# Patient Record
Sex: FEMALE | Race: BLACK OR AFRICAN AMERICAN | HISPANIC OR LATINO | Employment: FULL TIME | URBAN - METROPOLITAN AREA
[De-identification: names, ages, dates, MRNs, and addresses within clinical notes are randomized per-mention and may not be internally consistent; named-entity substitution may affect disease eponyms.]

---

## 2019-10-30 ENCOUNTER — HOSPITAL ENCOUNTER (EMERGENCY)
Facility: HOSPITAL | Age: 59
Discharge: HOME | End: 2019-10-30
Attending: EMERGENCY MEDICINE | Admitting: EMERGENCY MEDICINE
Payer: COMMERCIAL

## 2019-10-30 VITALS
RESPIRATION RATE: 16 BRPM | DIASTOLIC BLOOD PRESSURE: 62 MMHG | WEIGHT: 180 LBS | OXYGEN SATURATION: 97 % | TEMPERATURE: 97.7 F | HEIGHT: 64 IN | HEART RATE: 56 BPM | SYSTOLIC BLOOD PRESSURE: 123 MMHG | BODY MASS INDEX: 30.73 KG/M2

## 2019-10-30 DIAGNOSIS — T14.8XXA MUSCLE STRAIN: ICD-10-CM

## 2019-10-30 DIAGNOSIS — R10.9 FLANK PAIN: Primary | ICD-10-CM

## 2019-10-30 LAB
ALBUMIN SERPL BCP-MCNC: 3.5 G/DL (ref 3.5–5)
ALP SERPL-CCNC: 99 U/L (ref 46–116)
ALT SERPL W P-5'-P-CCNC: 26 U/L (ref 12–78)
ANION GAP SERPL CALCULATED.3IONS-SCNC: 8 MMOL/L (ref 4–13)
AST SERPL W P-5'-P-CCNC: 26 U/L (ref 5–45)
BASOPHILS # BLD AUTO: 0.02 THOUSANDS/ΜL (ref 0–0.1)
BASOPHILS NFR BLD AUTO: 0 % (ref 0–1)
BILIRUB SERPL-MCNC: 0.4 MG/DL (ref 0.2–1)
BILIRUB UR QL STRIP: NEGATIVE
BUN SERPL-MCNC: 12 MG/DL (ref 5–25)
CALCIUM SERPL-MCNC: 9.1 MG/DL (ref 8.3–10.1)
CHLORIDE SERPL-SCNC: 102 MMOL/L (ref 100–108)
CLARITY UR: CLEAR
CO2 SERPL-SCNC: 27 MMOL/L (ref 21–32)
COLOR UR: YELLOW
CREAT SERPL-MCNC: 0.63 MG/DL (ref 0.6–1.3)
EOSINOPHIL # BLD AUTO: 0.08 THOUSAND/ΜL (ref 0–0.61)
EOSINOPHIL NFR BLD AUTO: 2 % (ref 0–6)
ERYTHROCYTE [DISTWIDTH] IN BLOOD BY AUTOMATED COUNT: 19.3 % (ref 11.6–15.1)
GFR SERPL CREATININE-BSD FRML MDRD: 115 ML/MIN/1.73SQ M
GLUCOSE SERPL-MCNC: 89 MG/DL (ref 65–140)
GLUCOSE UR STRIP-MCNC: NEGATIVE MG/DL
HCT VFR BLD AUTO: 36.4 % (ref 34.8–46.1)
HGB BLD-MCNC: 11.1 G/DL (ref 11.5–15.4)
HGB UR QL STRIP.AUTO: NEGATIVE
IMM GRANULOCYTES # BLD AUTO: 0.01 THOUSAND/UL (ref 0–0.2)
IMM GRANULOCYTES NFR BLD AUTO: 0 % (ref 0–2)
KETONES UR STRIP-MCNC: NEGATIVE MG/DL
LEUKOCYTE ESTERASE UR QL STRIP: NEGATIVE
LIPASE SERPL-CCNC: 74 U/L (ref 73–393)
LYMPHOCYTES # BLD AUTO: 1.98 THOUSANDS/ΜL (ref 0.6–4.47)
LYMPHOCYTES NFR BLD AUTO: 38 % (ref 14–44)
MCH RBC QN AUTO: 24.6 PG (ref 26.8–34.3)
MCHC RBC AUTO-ENTMCNC: 30.5 G/DL (ref 31.4–37.4)
MCV RBC AUTO: 81 FL (ref 82–98)
MONOCYTES # BLD AUTO: 0.41 THOUSAND/ΜL (ref 0.17–1.22)
MONOCYTES NFR BLD AUTO: 8 % (ref 4–12)
NEUTROPHILS # BLD AUTO: 2.76 THOUSANDS/ΜL (ref 1.85–7.62)
NEUTS SEG NFR BLD AUTO: 52 % (ref 43–75)
NITRITE UR QL STRIP: NEGATIVE
NRBC BLD AUTO-RTO: 0 /100 WBCS
PH UR STRIP.AUTO: 7 [PH]
PLATELET # BLD AUTO: 326 THOUSANDS/UL (ref 149–390)
PMV BLD AUTO: 9.1 FL (ref 8.9–12.7)
POTASSIUM SERPL-SCNC: 4.7 MMOL/L (ref 3.5–5.3)
PROT SERPL-MCNC: 8 G/DL (ref 6.4–8.2)
PROT UR STRIP-MCNC: NEGATIVE MG/DL
RBC # BLD AUTO: 4.51 MILLION/UL (ref 3.81–5.12)
SODIUM SERPL-SCNC: 137 MMOL/L (ref 136–145)
SP GR UR STRIP.AUTO: 1.01 (ref 1–1.03)
UROBILINOGEN UR QL STRIP.AUTO: 0.2 E.U./DL
WBC # BLD AUTO: 5.26 THOUSAND/UL (ref 4.31–10.16)

## 2019-10-30 PROCEDURE — 99284 EMERGENCY DEPT VISIT MOD MDM: CPT

## 2019-10-30 PROCEDURE — 85025 COMPLETE CBC W/AUTO DIFF WBC: CPT | Performed by: EMERGENCY MEDICINE

## 2019-10-30 PROCEDURE — 36415 COLL VENOUS BLD VENIPUNCTURE: CPT | Performed by: EMERGENCY MEDICINE

## 2019-10-30 PROCEDURE — 80053 COMPREHEN METABOLIC PANEL: CPT | Performed by: EMERGENCY MEDICINE

## 2019-10-30 PROCEDURE — 83690 ASSAY OF LIPASE: CPT | Performed by: EMERGENCY MEDICINE

## 2019-10-30 PROCEDURE — 99284 EMERGENCY DEPT VISIT MOD MDM: CPT | Performed by: EMERGENCY MEDICINE

## 2019-10-30 PROCEDURE — 81003 URINALYSIS AUTO W/O SCOPE: CPT | Performed by: EMERGENCY MEDICINE

## 2019-10-30 PROCEDURE — 96374 THER/PROPH/DIAG INJ IV PUSH: CPT

## 2019-10-30 RX ORDER — NAPROXEN 500 MG/1
500 TABLET ORAL 2 TIMES DAILY WITH MEALS
Qty: 30 TABLET | Refills: 0 | Status: SHIPPED | OUTPATIENT
Start: 2019-10-30

## 2019-10-30 RX ORDER — KETOROLAC TROMETHAMINE 30 MG/ML
30 INJECTION, SOLUTION INTRAMUSCULAR; INTRAVENOUS ONCE
Status: COMPLETED | OUTPATIENT
Start: 2019-10-30 | End: 2019-10-30

## 2019-10-30 RX ORDER — METHOCARBAMOL 750 MG/1
750 TABLET, FILM COATED ORAL EVERY 6 HOURS PRN
Qty: 20 TABLET | Refills: 0 | Status: SHIPPED | OUTPATIENT
Start: 2019-10-30

## 2019-10-30 RX ADMIN — KETOROLAC TROMETHAMINE 30 MG: 30 INJECTION, SOLUTION INTRAMUSCULAR at 11:50

## 2019-10-30 NOTE — ED PROVIDER NOTES
History  Chief Complaint   Patient presents with    Flank Pain     c/o ongoing flank pain, had a CT done and shower "there's something on my right kidney"     Patient is a 53-year-old female  She presents to the emergency room for evaluation of right flank pain  It started about 3 weeks ago  She denies any trauma  She saw her primary MD   She had an ultrasound and a CT scan with contrast that showed a benign lesion to the right kidney  No kidney stones  There was a small fat containing umbilical hernia  She had fibroids  Otherwise examination was normal   She has been taking Aleve for the pain without relief  No nausea or vomiting  She has had some diarrhea  No constipation  No hematemesis, hematochezia or melena  No chest pain or shortness of breath  No fever or chills  No urinary or vaginal complaints  The pain is moderately severe and is worse with movement  None       History reviewed  No pertinent past medical history  Past Surgical History:   Procedure Laterality Date    KNEE SURGERY         History reviewed  No pertinent family history  I have reviewed and agree with the history as documented  Social History     Tobacco Use    Smoking status: Current Every Day Smoker     Packs/day: 0 25     Types: Cigarettes    Smokeless tobacco: Never Used   Substance Use Topics    Alcohol use: Not Currently    Drug use: Never        Review of Systems   Constitutional: Negative for chills and fever  HENT: Negative for rhinorrhea and sore throat  Eyes: Negative for pain, redness and visual disturbance  Respiratory: Negative for cough and shortness of breath  Cardiovascular: Negative for chest pain and leg swelling  Gastrointestinal: Positive for abdominal pain  Negative for diarrhea and vomiting  Endocrine: Negative for polydipsia and polyuria  Genitourinary: Positive for flank pain  Negative for dysuria, frequency, hematuria, vaginal bleeding and vaginal discharge  Musculoskeletal: Positive for back pain  Negative for neck pain  Skin: Negative for rash and wound  Allergic/Immunologic: Negative for immunocompromised state  Neurological: Negative for weakness, numbness and headaches  Hematological: Does not bruise/bleed easily  Psychiatric/Behavioral: Negative for hallucinations and suicidal ideas  All other systems reviewed and are negative  Physical Exam  Physical Exam   Constitutional: She is oriented to person, place, and time  She appears well-developed and well-nourished  No distress  HENT:   Head: Normocephalic and atraumatic  Mouth/Throat: Oropharynx is clear and moist    Eyes: Right eye exhibits no discharge  Left eye exhibits no discharge  No scleral icterus  Neck: Normal range of motion  Neck supple  Cardiovascular: Normal rate, regular rhythm, normal heart sounds and intact distal pulses  Exam reveals no gallop and no friction rub  No murmur heard  Pulmonary/Chest: Effort normal and breath sounds normal  No respiratory distress  She has no wheezes  She has no rales  Abdominal: Soft  Bowel sounds are normal  She exhibits no distension  There is no tenderness  There is no rebound and no guarding  No pulsatile mass  Musculoskeletal: She exhibits tenderness  She exhibits no edema or deformity  There is tenderness to the right flank into the right CVA area  There is pain with range of motion  Neurological: She is alert and oriented to person, place, and time  She has normal strength  No sensory deficit  GCS eye subscore is 4  GCS verbal subscore is 5  GCS motor subscore is 6  Skin: Skin is warm and dry  No rash noted  She is not diaphoretic  Psychiatric: She has a normal mood and affect  Her behavior is normal    Vitals reviewed        Vital Signs  ED Triage Vitals   Temperature Pulse Respirations Blood Pressure SpO2   10/30/19 1051 10/30/19 1049 10/30/19 1049 10/30/19 1049 10/30/19 1049   97 7 °F (36 5 °C) 60 18 130/62 98 % Temp Source Heart Rate Source Patient Position - Orthostatic VS BP Location FiO2 (%)   10/30/19 1051 10/30/19 1049 10/30/19 1049 10/30/19 1049 --   Oral Monitor Sitting Right arm       Pain Score       10/30/19 1049       8           Vitals:    10/30/19 1049 10/30/19 1115 10/30/19 1230   BP: 130/62 129/62 117/60   Pulse: 60 69 58   Patient Position - Orthostatic VS: Sitting Lying          Visual Acuity      ED Medications  Medications   ketorolac (TORADOL) injection 30 mg (30 mg Intravenous Given 10/30/19 1150)       Diagnostic Studies  Results Reviewed     Procedure Component Value Units Date/Time    Comprehensive metabolic panel [[id number]] Collected:  10/30/19 1157    Lab Status:  Final result Specimen:  Blood from Arm, Right Updated:  10/30/19 1228     Sodium 137 mmol/L      Potassium 4 7 mmol/L      Chloride 102 mmol/L      CO2 27 mmol/L      ANION GAP 8 mmol/L      BUN 12 mg/dL      Creatinine 0 63 mg/dL      Glucose 89 mg/dL      Calcium 9 1 mg/dL      AST 26 U/L      ALT 26 U/L      Alkaline Phosphatase 99 U/L      Total Protein 8 0 g/dL      Albumin 3 5 g/dL      Total Bilirubin 0 40 mg/dL      eGFR 115 ml/min/1 73sq m     Narrative:       [place] guidelines for Chronic Kidney Disease (CKD):     Stage 1 with normal or high GFR (GFR > 90 mL/min/1 73 square meters)    Stage 2 Mild CKD (GFR = 60-89 mL/min/1 73 square meters)    Stage 3A Moderate CKD (GFR = 45-59 mL/min/1 73 square meters)    Stage 3B Moderate CKD (GFR = 30-44 mL/min/1 73 square meters)    Stage 4 Severe CKD (GFR = 15-29 mL/min/1 73 square meters)    Stage 5 End Stage CKD (GFR <15 mL/min/1 73 square meters)  Note: GFR calculation is accurate only with a steady state creatinine    Lipase [[id number]]  (Normal) Collected:  10/30/19 1157    Lab Status:  Final result Specimen:  Blood from Arm, Right Updated:  10/30/19 1228     Lipase 74 u/L     CBC and differential [[id number]]  (Abnormal) Collected:  10/30/19 1157 Lab Status:  Final result Specimen:  Blood from Arm, Right Updated:  10/30/19 1209     WBC 5 26 Thousand/uL      RBC 4 51 Million/uL      Hemoglobin 11 1 g/dL      Hematocrit 36 4 %      MCV 81 fL      MCH 24 6 pg      MCHC 30 5 g/dL      RDW 19 3 %      MPV 9 1 fL      Platelets 991 Thousands/uL      nRBC 0 /100 WBCs      Neutrophils Relative 52 %      Immat GRANS % 0 %      Lymphocytes Relative 38 %      Monocytes Relative 8 %      Eosinophils Relative 2 %      Basophils Relative 0 %      Neutrophils Absolute 2 76 Thousands/µL      Immature Grans Absolute 0 01 Thousand/uL      Lymphocytes Absolute 1 98 Thousands/µL      Monocytes Absolute 0 41 Thousand/µL      Eosinophils Absolute 0 08 Thousand/µL      Basophils Absolute 0 02 Thousands/µL     UA w Reflex to Microscopic w Reflex to Culture [[id number]] Collected:  10/30/19 1158    Lab Status:  Final result Specimen:  Urine, Clean Catch Updated:  10/30/19 1208     Color, UA Yellow     Clarity, UA Clear     Specific Gravity, UA 1 010     pH, UA 7 0     Leukocytes, UA Negative     Nitrite, UA Negative     Protein, UA Negative mg/dl      Glucose, UA Negative mg/dl      Ketones, UA Negative mg/dl      Urobilinogen, UA 0 2 E U /dl      Bilirubin, UA Negative     Blood, UA Negative                 No orders to display              Procedures  Procedures       ED Course                               MDM  Number of Diagnoses or Management Options  Diagnosis management comments: Laboratory evaluation was unremarkable  There was no hematuria  No urinary tract infection  Patient did have decreases in her indices  She is likely iron deficient  She can follow up with her primary MD for this  Otherwise, I feel this is musculoskeletal   Imaging failed to show a cause for her pain  It is clearly worse with palpation and movement  It is more in her side and in the CVA area or abdomen  Most likely this is muscular pain    Appropriate for discharge and outpatient management  Amount and/or Complexity of Data Reviewed  Clinical lab tests: ordered and reviewed  Review and summarize past medical records: yes        Disposition  Final diagnoses:   Flank pain   Muscle strain     Time reflects when diagnosis was documented in both MDM as applicable and the Disposition within this note     Time User Action Codes Description Comment    10/30/2019  1:05 PM Venus Pennington Add [R10 9] Flank pain     10/30/2019  1:05 PM Virginie Roberta  8XXA] Muscle strain       ED Disposition     ED Disposition Condition Date/Time Comment    Discharge Stable Wed Oct 30, 2019  1:05 PM Eric Bess discharge to home/self care  Follow-up Information     Follow up With Specialties Details Why Contact Info    Follow-up with your regular doctor in 1 week for re-evaluation  Patient's Medications   Discharge Prescriptions    METHOCARBAMOL (ROBAXIN) 750 MG TABLET    Take 1 tablet (750 mg total) by mouth every 6 (six) hours as needed for muscle spasms (Muscle pain)       Start Date: 10/30/2019End Date: --       Order Dose: 750 mg       Quantity: 20 tablet    Refills: 0    NAPROXEN (NAPROSYN) 500 MG TABLET    Take 1 tablet (500 mg total) by mouth 2 (two) times a day with meals PRN pain       Start Date: 10/30/2019End Date: --       Order Dose: 500 mg       Quantity: 30 tablet    Refills: 0     No discharge procedures on file      ED Provider  Electronically Signed by           Violette Reyes MD  10/30/19 5850

## 2020-10-18 ENCOUNTER — APPOINTMENT (EMERGENCY)
Dept: VASCULAR ULTRASOUND | Facility: HOSPITAL | Age: 60
End: 2020-10-18
Payer: COMMERCIAL

## 2020-10-18 ENCOUNTER — HOSPITAL ENCOUNTER (EMERGENCY)
Facility: HOSPITAL | Age: 60
Discharge: HOME | End: 2020-10-18
Attending: EMERGENCY MEDICINE | Admitting: EMERGENCY MEDICINE
Payer: COMMERCIAL

## 2020-10-18 VITALS
HEART RATE: 65 BPM | OXYGEN SATURATION: 98 % | RESPIRATION RATE: 16 BRPM | SYSTOLIC BLOOD PRESSURE: 172 MMHG | DIASTOLIC BLOOD PRESSURE: 78 MMHG | TEMPERATURE: 98.8 F

## 2020-10-18 DIAGNOSIS — Z51.89 VISIT FOR WOUND CHECK: Primary | ICD-10-CM

## 2020-10-18 LAB
ALBUMIN SERPL BCP-MCNC: 2.9 G/DL (ref 3.5–5)
ALP SERPL-CCNC: 72 U/L (ref 46–116)
ALT SERPL W P-5'-P-CCNC: 35 U/L (ref 12–78)
ANION GAP SERPL CALCULATED.3IONS-SCNC: 6 MMOL/L (ref 4–13)
AST SERPL W P-5'-P-CCNC: 27 U/L (ref 5–45)
BASOPHILS # BLD AUTO: 0.02 THOUSANDS/ΜL (ref 0–0.1)
BASOPHILS NFR BLD AUTO: 0 % (ref 0–1)
BILIRUB DIRECT SERPL-MCNC: 0.17 MG/DL (ref 0–0.2)
BILIRUB SERPL-MCNC: 0.6 MG/DL (ref 0.2–1)
BUN SERPL-MCNC: 12 MG/DL (ref 5–25)
CALCIUM SERPL-MCNC: 8.8 MG/DL (ref 8.3–10.1)
CHLORIDE SERPL-SCNC: 103 MMOL/L (ref 100–108)
CO2 SERPL-SCNC: 28 MMOL/L (ref 21–32)
CREAT SERPL-MCNC: 0.76 MG/DL (ref 0.6–1.3)
EOSINOPHIL # BLD AUTO: 0.07 THOUSAND/ΜL (ref 0–0.61)
EOSINOPHIL NFR BLD AUTO: 1 % (ref 0–6)
ERYTHROCYTE [DISTWIDTH] IN BLOOD BY AUTOMATED COUNT: 15.5 % (ref 11.6–15.1)
GFR SERPL CREATININE-BSD FRML MDRD: 100 ML/MIN/1.73SQ M
GLUCOSE SERPL-MCNC: 153 MG/DL (ref 65–140)
HCT VFR BLD AUTO: 30.8 % (ref 34.8–46.1)
HGB BLD-MCNC: 9.5 G/DL (ref 11.5–15.4)
IMM GRANULOCYTES # BLD AUTO: 0.03 THOUSAND/UL (ref 0–0.2)
IMM GRANULOCYTES NFR BLD AUTO: 0 % (ref 0–2)
LYMPHOCYTES # BLD AUTO: 1.95 THOUSANDS/ΜL (ref 0.6–4.47)
LYMPHOCYTES NFR BLD AUTO: 27 % (ref 14–44)
MCH RBC QN AUTO: 27.5 PG (ref 26.8–34.3)
MCHC RBC AUTO-ENTMCNC: 30.8 G/DL (ref 31.4–37.4)
MCV RBC AUTO: 89 FL (ref 82–98)
MONOCYTES # BLD AUTO: 0.39 THOUSAND/ΜL (ref 0.17–1.22)
MONOCYTES NFR BLD AUTO: 5 % (ref 4–12)
NEUTROPHILS # BLD AUTO: 4.7 THOUSANDS/ΜL (ref 1.85–7.62)
NEUTS SEG NFR BLD AUTO: 67 % (ref 43–75)
NRBC BLD AUTO-RTO: 0 /100 WBCS
PLATELET # BLD AUTO: 320 THOUSANDS/UL (ref 149–390)
PMV BLD AUTO: 8.7 FL (ref 8.9–12.7)
POTASSIUM SERPL-SCNC: 3.9 MMOL/L (ref 3.5–5.3)
PROT SERPL-MCNC: 7.7 G/DL (ref 6.4–8.2)
RBC # BLD AUTO: 3.46 MILLION/UL (ref 3.81–5.12)
SODIUM SERPL-SCNC: 137 MMOL/L (ref 136–145)
WBC # BLD AUTO: 7.16 THOUSAND/UL (ref 4.31–10.16)

## 2020-10-18 PROCEDURE — 99284 EMERGENCY DEPT VISIT MOD MDM: CPT | Performed by: EMERGENCY MEDICINE

## 2020-10-18 PROCEDURE — 80076 HEPATIC FUNCTION PANEL: CPT | Performed by: EMERGENCY MEDICINE

## 2020-10-18 PROCEDURE — 36415 COLL VENOUS BLD VENIPUNCTURE: CPT | Performed by: EMERGENCY MEDICINE

## 2020-10-18 PROCEDURE — 93971 EXTREMITY STUDY: CPT | Performed by: SURGERY

## 2020-10-18 PROCEDURE — 93971 EXTREMITY STUDY: CPT

## 2020-10-18 PROCEDURE — 99283 EMERGENCY DEPT VISIT LOW MDM: CPT

## 2020-10-18 PROCEDURE — 85025 COMPLETE CBC W/AUTO DIFF WBC: CPT | Performed by: EMERGENCY MEDICINE

## 2020-10-18 PROCEDURE — 80048 BASIC METABOLIC PNL TOTAL CA: CPT | Performed by: EMERGENCY MEDICINE

## 2020-10-18 RX ORDER — POLYETHYLENE GLYCOL 1450
17 POWDER (GRAM) MISCELLANEOUS
COMMUNITY
Start: 2020-10-14 | End: 2020-10-28

## 2020-10-18 RX ORDER — OXYCODONE HYDROCHLORIDE 5 MG/1
2.5-5 TABLET ORAL EVERY 6 HOURS PRN
COMMUNITY
Start: 2020-10-14 | End: 2020-10-21

## 2021-12-02 ENCOUNTER — HOSPITAL ENCOUNTER (EMERGENCY)
Facility: HOSPITAL | Age: 61
Discharge: HOME | End: 2021-12-02
Admitting: EMERGENCY MEDICINE
Payer: COMMERCIAL

## 2021-12-02 VITALS
TEMPERATURE: 98 F | RESPIRATION RATE: 16 BRPM | SYSTOLIC BLOOD PRESSURE: 135 MMHG | HEART RATE: 73 BPM | DIASTOLIC BLOOD PRESSURE: 60 MMHG | OXYGEN SATURATION: 100 %

## 2021-12-02 DIAGNOSIS — J02.9 SORE THROAT: Primary | ICD-10-CM

## 2021-12-02 DIAGNOSIS — Z20.822 CLOSE EXPOSURE TO COVID-19 VIRUS: ICD-10-CM

## 2021-12-02 LAB
FLUAV RNA RESP QL NAA+PROBE: NEGATIVE
FLUBV RNA RESP QL NAA+PROBE: NEGATIVE
RSV RNA RESP QL NAA+PROBE: NEGATIVE
S PYO DNA THROAT QL NAA+PROBE: NORMAL
SARS-COV-2 RNA RESP QL NAA+PROBE: POSITIVE

## 2021-12-02 PROCEDURE — 99283 EMERGENCY DEPT VISIT LOW MDM: CPT | Performed by: PHYSICIAN ASSISTANT

## 2021-12-02 PROCEDURE — 0241U HB NFCT DS VIR RESP RNA 4 TRGT: CPT | Performed by: PHYSICIAN ASSISTANT

## 2021-12-02 PROCEDURE — 99283 EMERGENCY DEPT VISIT LOW MDM: CPT

## 2021-12-02 PROCEDURE — 87651 STREP A DNA AMP PROBE: CPT | Performed by: PHYSICIAN ASSISTANT

## 2022-11-30 ENCOUNTER — APPOINTMENT (EMERGENCY)
Dept: CT IMAGING | Facility: HOSPITAL | Age: 62
End: 2022-11-30

## 2022-11-30 ENCOUNTER — HOSPITAL ENCOUNTER (EMERGENCY)
Facility: HOSPITAL | Age: 62
Discharge: HOME | End: 2022-12-01
Attending: EMERGENCY MEDICINE

## 2022-11-30 DIAGNOSIS — R13.10 DYSPHAGIA: Primary | ICD-10-CM

## 2022-11-30 DIAGNOSIS — N85.2 ENLARGED UTERUS: ICD-10-CM

## 2022-11-30 LAB
ALBUMIN SERPL BCP-MCNC: 3.8 G/DL (ref 3.5–5)
ALP SERPL-CCNC: 97 U/L (ref 46–116)
ALT SERPL W P-5'-P-CCNC: 23 U/L (ref 12–78)
ANION GAP SERPL CALCULATED.3IONS-SCNC: 9 MMOL/L (ref 4–13)
AST SERPL W P-5'-P-CCNC: 19 U/L (ref 5–45)
BASOPHILS # BLD AUTO: 0.02 THOUSANDS/ÂΜL (ref 0–0.1)
BASOPHILS NFR BLD AUTO: 0 % (ref 0–1)
BILIRUB SERPL-MCNC: 0.3 MG/DL (ref 0.2–1)
BUN SERPL-MCNC: 12 MG/DL (ref 5–25)
CALCIUM SERPL-MCNC: 9.5 MG/DL (ref 8.3–10.1)
CARDIAC TROPONIN I PNL SERPL HS: 6 NG/L
CHLORIDE SERPL-SCNC: 103 MMOL/L (ref 96–108)
CO2 SERPL-SCNC: 26 MMOL/L (ref 21–32)
CREAT SERPL-MCNC: 0.78 MG/DL (ref 0.6–1.3)
EOSINOPHIL # BLD AUTO: 0.07 THOUSAND/ÂΜL (ref 0–0.61)
EOSINOPHIL NFR BLD AUTO: 1 % (ref 0–6)
ERYTHROCYTE [DISTWIDTH] IN BLOOD BY AUTOMATED COUNT: 14.6 % (ref 11.6–15.1)
GFR SERPL CREATININE-BSD FRML MDRD: 82 ML/MIN/1.73SQ M
GLUCOSE SERPL-MCNC: 100 MG/DL (ref 65–140)
HCT VFR BLD AUTO: 41.1 % (ref 34.8–46.1)
HGB BLD-MCNC: 12.9 G/DL (ref 11.5–15.4)
IMM GRANULOCYTES # BLD AUTO: 0.01 THOUSAND/UL (ref 0–0.2)
IMM GRANULOCYTES NFR BLD AUTO: 0 % (ref 0–2)
LIPASE SERPL-CCNC: 76 U/L (ref 73–393)
LYMPHOCYTES # BLD AUTO: 1.74 THOUSANDS/ÂΜL (ref 0.6–4.47)
LYMPHOCYTES NFR BLD AUTO: 33 % (ref 14–44)
MCH RBC QN AUTO: 28.4 PG (ref 26.8–34.3)
MCHC RBC AUTO-ENTMCNC: 31.4 G/DL (ref 31.4–37.4)
MCV RBC AUTO: 91 FL (ref 82–98)
MONOCYTES # BLD AUTO: 0.43 THOUSAND/ÂΜL (ref 0.17–1.22)
MONOCYTES NFR BLD AUTO: 8 % (ref 4–12)
NEUTROPHILS # BLD AUTO: 2.95 THOUSANDS/ÂΜL (ref 1.85–7.62)
NEUTS SEG NFR BLD AUTO: 58 % (ref 43–75)
NRBC BLD AUTO-RTO: 0 /100 WBCS
PLATELET # BLD AUTO: 296 THOUSANDS/UL (ref 149–390)
PMV BLD AUTO: 9.3 FL (ref 8.9–12.7)
POTASSIUM SERPL-SCNC: 3.9 MMOL/L (ref 3.5–5.3)
PROT SERPL-MCNC: 8.1 G/DL (ref 6.4–8.4)
RBC # BLD AUTO: 4.54 MILLION/UL (ref 3.81–5.12)
SODIUM SERPL-SCNC: 138 MMOL/L (ref 135–147)
WBC # BLD AUTO: 5.22 THOUSAND/UL (ref 4.31–10.16)

## 2022-11-30 RX ADMIN — SODIUM CHLORIDE 1000 ML: 0.9 INJECTION, SOLUTION INTRAVENOUS at 20:21

## 2022-11-30 RX ADMIN — IOHEXOL 100 ML: 350 INJECTION, SOLUTION INTRAVENOUS at 21:29

## 2022-12-01 ENCOUNTER — TELEPHONE (OUTPATIENT)
Dept: OTHER | Facility: OTHER | Age: 62
End: 2022-12-01

## 2022-12-01 VITALS
RESPIRATION RATE: 18 BRPM | DIASTOLIC BLOOD PRESSURE: 90 MMHG | OXYGEN SATURATION: 95 % | SYSTOLIC BLOOD PRESSURE: 132 MMHG | TEMPERATURE: 97.2 F | HEART RATE: 67 BPM

## 2022-12-01 LAB
ATRIAL RATE: 68 BPM
P AXIS: 66 DEGREES
PR INTERVAL: 174 MS
QRS AXIS: 1 DEGREES
QRSD INTERVAL: 70 MS
QT INTERVAL: 408 MS
QTC INTERVAL: 433 MS
T WAVE AXIS: 36 DEGREES
VENTRICULAR RATE: 68 BPM

## 2022-12-01 NOTE — ED NOTES
Alert in no acute distress pt tolerated po fluids  Written  Discharge instructions given  No c/o offered       Victorina Bird RN  12/01/22 6077

## 2022-12-01 NOTE — DISCHARGE INSTRUCTIONS
Please follow-up with gastroenterology for her difficulty swallowing  Please follow-up with OBGYN for your enlarged uterus  Please call them tomorrow  Return to the emergency department for any new worsening symptoms or if you cannot tolerate liquids

## 2022-12-01 NOTE — ED PROVIDER NOTES
Pt Name: Zoe Brian  MRN: [id number]  [place] 1960  Age/Sex: 63 y o  female  Date of evaluation: 11/30/2022  PCP: No primary care provider on file  CHIEF COMPLAINT    Chief Complaint   Patient presents with   • Abdominal Pain     Pt states she has been having digestion issue for the last week and feels she is unable to digest her foot  Pt adds she has been vomiting with a burning epigastric pain  HPI and MDM    63 y o  female presenting with dysphagia  Patient states over the last week or so she has been having difficulty swallowing  Also has abdominal pain, points towards her epigastric region  States it is primarily with solids, but sometimes also has difficulty with liquids  This has never occurred before  Denies any history of reflux  Denies any medical problems  Has had some nausea and vomiting as well  Medications   sodium chloride 0 9 % bolus 1,000 mL (has no administration in time range)         Past Medical and Surgical History    History reviewed  No pertinent past medical history  Past Surgical History:   Procedure Laterality Date   • KNEE SURGERY         History reviewed  No pertinent family history  Social History     Tobacco Use   • Smoking status: Every Day     Packs/day: 0 25     Types: Cigarettes   • Smokeless tobacco: Never   Substance Use Topics   • Alcohol use: Not Currently   • Drug use: Never           Allergies    Allergies   Allergen Reactions   • Penicillins Rash       Home Medications    Prior to Admission medications    Medication Sig Start Date End Date Taking?  Authorizing Provider   Cyanocobalamin 1000 MCG SUBL Place 1,000 mcg under the tongue 9/8/20   Historical Provider, MD   methocarbamol (ROBAXIN) 750 mg tablet Take 1 tablet (750 mg total) by mouth every 6 (six) hours as needed for muscle spasms (Muscle pain) 10/30/19   Debra Shankar MD   naproxen (NAPROSYN) 500 mg tablet Take 1 tablet (500 mg total) by mouth 2 (two) times a day with meals PRN pain 10/30/19   Modesto Gimenez MD           Review of Systems    Review of Systems   Constitutional: Negative for chills and fever  HENT: Negative for rhinorrhea and sore throat  Eyes: Negative for pain and visual disturbance  Respiratory: Negative for cough and shortness of breath  Cardiovascular: Negative for chest pain and leg swelling  Gastrointestinal: Positive for abdominal pain, nausea and vomiting  Genitourinary: Negative for dysuria and hematuria  Musculoskeletal: Negative for back pain and myalgias  Skin: Negative for rash and wound  Neurological: Negative for syncope and headaches  Physical Exam      ED Triage Vitals   Temperature Pulse Respirations Blood Pressure SpO2   11/30/22 1827 11/30/22 1827 11/30/22 1827 11/30/22 1828 11/30/22 1827   (!) 97 2 °F (36 2 °C) 90 20 167/70 97 %      Temp src Heart Rate Source Patient Position - Orthostatic VS BP Location FiO2 (%)   -- -- -- -- --             Pain Score       11/30/22 1827       8               Physical Exam  Constitutional:       General: She is not in acute distress  Appearance: She is not ill-appearing  HENT:      Head: Normocephalic and atraumatic  Nose: Nose normal       Mouth/Throat:      Mouth: Mucous membranes are moist    Eyes:      Extraocular Movements: Extraocular movements intact  Pupils: Pupils are equal, round, and reactive to light  Cardiovascular:      Rate and Rhythm: Normal rate and regular rhythm  Pulmonary:      Effort: No respiratory distress  Breath sounds: Normal breath sounds  No wheezing  Abdominal:      General: There is no distension  Palpations: Abdomen is soft  Tenderness: There is no abdominal tenderness  Musculoskeletal:         General: No swelling or deformity  Normal range of motion  Cervical back: Normal range of motion and neck supple  Skin:     General: Skin is warm  Findings: No erythema     Neurological: Mental Status: She is alert and oriented to person, place, and time  Mental status is at baseline  Diagnostic Results      Labs:    Results Reviewed     Procedure Component Value Units Date/Time    CBC and differential [[id number]]     Lab Status: No result Specimen: Blood     Comprehensive metabolic panel [[id number]]     Lab Status: No result Specimen: Blood     HS Troponin 0hr (reflex protocol) [[id number]]     Lab Status: No result Specimen: Blood     Lipase [[id number]]     Lab Status: No result Specimen: Blood           All labs reviewed and utilized in the medical decision making process    Radiology:    CT chest abdomen pelvis w contrast    (Results Pending)       All radiology studies independently viewed by me and interpreted by the radiologist     Procedure    Procedures        FINAL IMPRESSION    Final diagnoses:   None         DISPOSITION    ED Disposition     None      Follow-up Information    None           PATIENT REFERRED TO:    No follow-up provider specified  DISCHARGE MEDICATIONS:    Patient's Medications   Discharge Prescriptions    No medications on file       No discharge procedures on file  Luz Mccormick DO        This note was partially completed using voice recognition technology, and was scanned for gross errors; however some errors may still exist  Please contact the author with any questions or requests for clarification  Moderate CKD (GFR = 45-59 mL/min/1 73 square meters)  •  Stage 3B Moderate CKD (GFR = 30-44 mL/min/1 73 square meters)  •  Stage 4 Severe CKD (GFR = 15-29 mL/min/1 73 square meters)  •  Stage 5 End Stage CKD (GFR <15 mL/min/1 73 square meters)  Note: GFR calculation is accurate only with a steady state creatinine    CBC and differential [[id number]] Collected: 11/30/22 2020    Lab Status: Final result Specimen: Blood from Arm, Right Updated: 11/30/22 2039     WBC 5 22 Thousand/uL      RBC 4 54 Million/uL      Hemoglobin 12 9 g/dL      Hematocrit 41 1 %      MCV 91 fL      MCH 28 4 pg      MCHC 31 4 g/dL      RDW 14 6 %      MPV 9 3 fL      Platelets 990 Thousands/uL      nRBC 0 /100 WBCs      Neutrophils Relative 58 %      Immat GRANS % 0 %      Lymphocytes Relative 33 %      Monocytes Relative 8 %      Eosinophils Relative 1 %      Basophils Relative 0 %      Neutrophils Absolute 2 95 Thousands/µL      Immature Grans Absolute 0 01 Thousand/uL      Lymphocytes Absolute 1 74 Thousands/µL      Monocytes Absolute 0 43 Thousand/µL      Eosinophils Absolute 0 07 Thousand/µL      Basophils Absolute 0 02 Thousands/µL           All labs reviewed and utilized in the medical decision making process    Radiology:    CT chest abdomen pelvis w contrast   Final Result      Markedly enlarged uterus with multiple hypodense masses and fluid within the endometrium  Further evaluation with pelvic sonogram is recommended  Fat-containing right renal lesion likely representing a angiomyolipoma      The study was marked in EPIC for immediate notification              Workstation performed: [id number]             All radiology studies independently viewed by me and interpreted by the radiologist     Procedure    Procedures        FINAL IMPRESSION    Final diagnoses:   Dysphagia   Enlarged uterus         DISPOSITION    Time reflects when diagnosis was documented in both MDM as applicable and the Disposition within this note     Time User Action Codes Description Comment    11/30/2022 11:01 PM Ronalalbertinaa  Add [R13 10] Dysphagia     11/30/2022 11:01 PM Darletta  Add [N85 2] Enlarged uterus       ED Disposition     ED Disposition   Discharge    Condition   Stable    Date/Time   Wed Nov 30, 2022 11:01 PM    Comment   Rebeka Garrett discharge to home/self care  Follow-up Information     Follow up With Specialties Details Why Contact Info Additional Information    [employer]  Call  To establish a family doctor to follow up with [id number] [facility],[address] [facility] Gastroenterology Call   [address]  [address] [address]  Lina Zhang [address] [facility], [address] [facility], [address], [facility], [place], [address] [place] [address] Drive:    Radha Vasquez  [phone]    Call   To establish a family doctor to follow up with    Yasmani Guajardo [facility] [address]  [address] [facility]  [phone]  Call         DISCHARGE MEDICATIONS:    Discharge Medication List as of 11/30/2022 11:29 PM      CONTINUE these medications which have NOT CHANGED    Details   Cyanocobalamin 1000 MCG SUBL Place 1,000 mcg under the tongue, Starting Tue 9/8/2020, Historical Med      methocarbamol (ROBAXIN) 750 mg tablet Take 1 tablet (750 mg total) by mouth every 6 (six) hours as needed for muscle spasms (Muscle pain), Starting Wed 10/30/2019, Print      naproxen (NAPROSYN) 500 mg tablet Take 1 tablet (500 mg total) by mouth 2 (two) times a day with meals PRN pain, Starting Wed 10/30/2019, [address], DO        This note was partially completed using voice recognition technology, and was scanned for gross errors; however some errors may still exist  Please contact the author with any questions or requests for clarification        Essie Ross DO  12/13/22 0015

## 2022-12-07 RX ORDER — PREDNISONE 20 MG/1
TABLET ORAL
COMMUNITY
Start: 2022-10-18

## 2022-12-07 RX ORDER — IBUPROFEN 800 MG/1
800 TABLET ORAL EVERY 8 HOURS PRN
COMMUNITY
Start: 2022-10-18

## 2022-12-07 RX ORDER — LANOLIN ALCOHOL/MO/W.PET/CERES
1 CREAM (GRAM) TOPICAL DAILY
COMMUNITY
Start: 2021-10-02

## 2022-12-07 RX ORDER — IPRATROPIUM BROMIDE 42 UG/1
2 SPRAY, METERED NASAL
COMMUNITY
Start: 2021-10-08

## 2022-12-07 RX ORDER — CYCLOBENZAPRINE HCL 5 MG
TABLET ORAL
COMMUNITY
Start: 2022-10-18

## 2022-12-08 ENCOUNTER — OFFICE VISIT (OUTPATIENT)
Dept: GASTROENTEROLOGY | Facility: CLINIC | Age: 62
End: 2022-12-08

## 2022-12-08 VITALS
DIASTOLIC BLOOD PRESSURE: 72 MMHG | WEIGHT: 178 LBS | HEIGHT: 64 IN | BODY MASS INDEX: 30.39 KG/M2 | SYSTOLIC BLOOD PRESSURE: 100 MMHG | OXYGEN SATURATION: 95 % | HEART RATE: 75 BPM

## 2022-12-08 DIAGNOSIS — R13.10 DYSPHAGIA: ICD-10-CM

## 2022-12-08 NOTE — PROGRESS NOTES
[facility] - Outpatient Consultation  Kadi Randhawa 63 y o  female MRN: [id number]  Encounter: [id number]          ASSESSMENT AND PLAN:      1  Dysphagia  - Ambulatory Referral to Gastroenterology  - EGD; Future    ______________________________________________________________________    HPI: Claudene Hopes is a 72-year-old female who comes the office today complaining of solid food dysphagia  This problem happened years ago and she underwent endoscopy with dilation that I performed  She denies any problems with abdominal pain  She does admit to nausea but she denies vomiting  She denies heartburn, diarrhea, diarrhea constipation, rectal bleeding, melena, hematemesis, weight loss  She underwent a CT scan of the chest abdomen and chest and pelvis on November 30, 2022  The findings included a markedly enlarged uterus with multiple hypodense masses and fluid within the endometrium  Further evaluation with pelvic ultrasound was recommended  She had a fat-containing right renal lesion likely representing an angiomyolipoma  She states she is up-to-date on her colonoscopies  There is no family history for colon cancer, esophageal cancer, or stomach cancer  REVIEW OF SYSTEMS:    CONSTITUTIONAL: Denies any fever, chills, rigors, and weight loss  HEENT: No earache or tinnitus  Denies hearing loss or visual disturbances  CARDIOVASCULAR: No chest pain or palpitations  RESPIRATORY: Denies any cough, hemoptysis, shortness of breath or dyspnea on exertion  GASTROINTESTINAL: As noted in the History of Present Illness  GENITOURINARY: No problems with urination  Denies any hematuria or dysuria  NEUROLOGIC: No dizziness or vertigo, denies headaches  MUSCULOSKELETAL: Denies any muscle or joint pain  SKIN: Denies skin rashes or itching  ENDOCRINE: Denies excessive thirst  Denies intolerance to heat or cold  PSYCHOSOCIAL: Denies depression or anxiety  Denies any recent memory loss  Historical Information   Past Medical History:   Diagnosis Date   • Chronic kidney disease      Past Surgical History:   Procedure Laterality Date   • KNEE SURGERY       Social History   Social History     Substance and Sexual Activity   Alcohol Use Not Currently     Social History     Substance and Sexual Activity   Drug Use Never     Social History     Tobacco Use   Smoking Status Former   • Packs/day: 0 25   • Types: Cigarettes   Smokeless Tobacco Never     History reviewed  No pertinent family history  Meds/Allergies       Current Outpatient Medications:   •  ascorbic Acid (VITAMIN C) 500 MG CPCR  •  Cyanocobalamin 1000 MCG SUBL  •  cyclobenzaprine (FLEXERIL) 5 mg tablet  •  ferrous sulfate 325 (65 FE) MG EC tablet  •  ibuprofen (MOTRIN) 800 mg tablet  •  ipratropium (ATROVENT) 0 06 % nasal spray  •  methocarbamol (ROBAXIN) 750 mg tablet  •  naproxen (NAPROSYN) 500 mg tablet  •  nicotine polacrilex (NICORETTE) 4 mg gum  •  predniSONE 20 mg tablet    Allergies   Allergen Reactions   • Penicillins Rash           Objective     Blood pressure 100/72, pulse 75, height 5' 4" (1 626 m), weight 80 7 kg (178 lb), SpO2 95 %  Body mass index is 30 55 kg/m²  PHYSICAL EXAM:      General Appearance:   Alert, cooperative, no distress   HEENT:   Normocephalic, atraumatic, anicteric      Neck:  Supple, symmetrical, trachea midline   Lungs:   Clear to auscultation bilaterally; no rales, rhonchi or wheezing; respirations unlabored    Heart[de-identified]   Regular rate and rhythm; no murmur, rub, or gallop  Abdomen:   Soft, non-tender, non-distended; normal bowel sounds; no masses, no organomegaly    Genitalia:   Deferred    Rectal:   Deferred    Extremities:  No cyanosis, clubbing or edema    Pulses:  2+ and symmetric    Skin:  No jaundice, rashes, or lesions    Lymph nodes:  No palpable cervical lymphadenopathy        Lab Results:   No visits with results within 1 Day(s) from this visit     Latest known visit with results is:   Admission on 11/30/2022, Discharged on 12/01/2022   Component Date Value   • WBC 11/30/2022 5 22    • RBC 11/30/2022 4 54    • Hemoglobin 11/30/2022 12 9    • Hematocrit 11/30/2022 41 1    • MCV 11/30/2022 91    • MCH 11/30/2022 28 4    • MCHC 11/30/2022 31 4    • RDW 11/30/2022 14 6    • MPV 11/30/2022 9 3    • Platelets 83/62/7967 296    • nRBC 11/30/2022 0    • Neutrophils Relative 11/30/2022 58    • Immat GRANS % 11/30/2022 0    • Lymphocytes Relative 11/30/2022 33    • Monocytes Relative 11/30/2022 8    • Eosinophils Relative 11/30/2022 1    • Basophils Relative 11/30/2022 0    • Neutrophils Absolute 11/30/2022 2 95    • Immature Grans Absolute 11/30/2022 0 01    • Lymphocytes Absolute 11/30/2022 1 74    • Monocytes Absolute 11/30/2022 0 43    • Eosinophils Absolute 11/30/2022 0 07    • Basophils Absolute 11/30/2022 0 02    • Sodium 11/30/2022 138    • Potassium 11/30/2022 3 9    • Chloride 11/30/2022 103    • CO2 11/30/2022 26    • ANION GAP 11/30/2022 9    • BUN 11/30/2022 12    • Creatinine 11/30/2022 0 78    • Glucose 11/30/2022 100    • Calcium 11/30/2022 9 5    • AST 11/30/2022 19    • ALT 11/30/2022 23    • Alkaline Phosphatase 11/30/2022 97    • Total Protein 11/30/2022 8 1    • Albumin 11/30/2022 3 8    • Total Bilirubin 11/30/2022 0 30    • eGFR 11/30/2022 82    • hs TnI 0hr 11/30/2022 6    • Lipase 11/30/2022 76    • Ventricular Rate 11/30/2022 68    • Atrial Rate 11/30/2022 68    • PA Interval 11/30/2022 174    • QRSD Interval 11/30/2022 70    • QT Interval 11/30/2022 408    • QTC Interval 11/30/2022 433    • P Axis 11/30/2022 66    • QRS Axis 11/30/2022 1    • T Wave [place] 11/30/2022 36          Radiology Results:   CT chest abdomen pelvis w contrast    Result Date: 11/30/2022  Narrative: CT CHEST, ABDOMEN AND PELVIS WITH IV CONTRAST INDICATION:   upper abd pain, dysphagia  COMPARISON:  None  TECHNIQUE: CT examination of the chest, abdomen and pelvis was performed   Axial, sagittal, and coronal 2D reformatted images were created from the source data and submitted for interpretation  Radiation dose length product (DLP) for this visit:  591 mGy-cm   This examination, like all CT scans performed in the [facility], was performed utilizing techniques to minimize radiation dose exposure, including the use of iterative reconstruction and automated exposure control  IV Contrast:  100 mL of iohexol (OMNIPAQUE) Enteric Contrast: Enteric contrast was administered  FINDINGS: CHEST LUNGS:  Lungs are clear  There is no tracheal or endobronchial lesion  PLEURA:  Unremarkable  HEART/GREAT VESSELS: Heart is unremarkable for patient's age  No thoracic aortic aneurysm  MEDIASTINUM AND BETLRAN:  Unremarkable  CHEST WALL AND LOWER NECK:  Unremarkable  ABDOMEN LIVER/BILIARY TREE:  Unremarkable  GALLBLADDER:  No calcified gallstones  No pericholecystic inflammatory change  SPLEEN:  Unremarkable  PANCREAS:  Unremarkable  ADRENAL GLANDS: There is a 1 4 cm left adrenal nodule  In patients with no cancer history and new/enlarging adrenal nodule, recommend adrenal mass protocol CT to characterize, and biochemical evaluation and depending on rate of growth, surgical resection  (without biopsy) to treat possible adrenal cortical carcinoma may be warranted  Adrenal recommendation based on institutional consensus and Journal of [employer] of Radiology 2017;14:9174-8883 KIDNEYS/URETERS:  Fat-containing lesion measuring 1 9 cm in the right kidney likely representing a angiomyolipoma  STOMACH AND BOWEL:  Unremarkable  APPENDIX:  No findings to suggest appendicitis  ABDOMINOPELVIC CAVITY:  No ascites  No pneumoperitoneum  No lymphadenopathy  VESSELS:  Unremarkable for patient's age  PELVIS REPRODUCTIVE ORGANS:  Markedly enlarged uterus with multiple hypodense masses  Fluid within the endometrium  URINARY BLADDER:  Unremarkable  ABDOMINAL WALL/INGUINAL REGIONS:  Unremarkable   OSSEOUS STRUCTURES:  No acute fracture or destructive osseous lesion  Impression: Markedly enlarged uterus with multiple hypodense masses and fluid within the endometrium  Further evaluation with pelvic sonogram is recommended  Fat-containing right renal lesion likely representing a angiomyolipoma The study was marked in EPIC for immediate notification   Workstation performed: [id number]

## 2022-12-08 NOTE — LETTER
December 12, 2022     [address]    Patient: Constantino Agudelo   YOB: 1960   Date of Visit: 12/8/2022       Dear Dr Dung Tavares: Thank you for referring Leona Lane to me for evaluation  Below are my notes for this consultation  If you have questions, please do not hesitate to call me  I look forward to following your patient along with you  Sincerely,        Hermilo Zimmerman DO        CC: No Recipients  Hermilo Zimmerman [facility] [place]  12/8/2022  4:56 PM  Signed  [facility] - Outpatient Consultation  Constantino Agudelo 63 y o  female MRN: [id number]  Encounter: [id number]          ASSESSMENT AND PLAN:      1  Dysphagia  - Ambulatory Referral to Gastroenterology  - EGD; Future    ______________________________________________________________________    HPI: Richa Warren is a 62-year-old female who comes the office today complaining of solid food dysphagia  This problem happened years ago and she underwent endoscopy with dilation that I performed  She denies any problems with abdominal pain  She does admit to nausea but she denies vomiting  She denies heartburn, diarrhea, diarrhea constipation, rectal bleeding, melena, hematemesis, weight loss  She underwent a CT scan of the chest abdomen and chest and pelvis on November 30, 2022  The findings included a markedly enlarged uterus with multiple hypodense masses and fluid within the endometrium  Further evaluation with pelvic ultrasound was recommended  She had a fat-containing right renal lesion likely representing an angiomyolipoma  She states she is up-to-date on her colonoscopies  There is no family history for colon cancer, esophageal cancer, or stomach cancer  REVIEW OF SYSTEMS:    CONSTITUTIONAL: Denies any fever, chills, rigors, and weight loss  HEENT: No earache or tinnitus  Denies hearing loss or visual disturbances  CARDIOVASCULAR: No chest pain or palpitations  RESPIRATORY: Denies any cough, hemoptysis, shortness of breath or dyspnea on exertion  GASTROINTESTINAL: As noted in the History of Present Illness  GENITOURINARY: No problems with urination  Denies any hematuria or dysuria  NEUROLOGIC: No dizziness or vertigo, denies headaches  MUSCULOSKELETAL: Denies any muscle or joint pain  SKIN: Denies skin rashes or itching  ENDOCRINE: Denies excessive thirst  Denies intolerance to heat or cold  PSYCHOSOCIAL: Denies depression or anxiety  Denies any recent memory loss  Historical Information   Past Medical History:   Diagnosis Date   • Chronic kidney disease      Past Surgical History:   Procedure Laterality Date   • KNEE SURGERY       Social History   Social History     Substance and Sexual Activity   Alcohol Use Not Currently     Social History     Substance and Sexual Activity   Drug Use Never     Social History     Tobacco Use   Smoking Status Former   • Packs/day: 0 25   • Types: Cigarettes   Smokeless Tobacco Never     History reviewed  No pertinent family history  Meds/Allergies        Current Outpatient Medications:   •  ascorbic Acid (VITAMIN C) 500 MG CPCR  •  Cyanocobalamin 1000 MCG SUBL  •  cyclobenzaprine (FLEXERIL) 5 mg tablet  •  ferrous sulfate 325 (65 FE) MG EC tablet  •  ibuprofen (MOTRIN) 800 mg tablet  •  ipratropium (ATROVENT) 0 06 % nasal spray  •  methocarbamol (ROBAXIN) 750 mg tablet  •  naproxen (NAPROSYN) 500 mg tablet  •  nicotine polacrilex (NICORETTE) 4 mg gum  •  predniSONE 20 mg tablet    Allergies   Allergen Reactions   • Penicillins Rash           Objective      Blood pressure 100/72, pulse 75, height 5' 4" (1 626 m), weight 80 7 kg (178 lb), SpO2 95 %  Body mass index is 30 55 kg/m²          PHYSICAL EXAM:      General Appearance:   Alert, cooperative, no distress   HEENT:   Normocephalic, atraumatic, anicteric      Neck:  Supple, symmetrical, trachea midline   Lungs:   Clear to auscultation bilaterally; no rales, rhonchi or wheezing; respirations unlabored    Heart[de-identified]   Regular rate and rhythm; no murmur, rub, or gallop  Abdomen:   Soft, non-tender, non-distended; normal bowel sounds; no masses, no organomegaly    Genitalia:   Deferred    Rectal:   Deferred    Extremities:  No cyanosis, clubbing or edema    Pulses:  2+ and symmetric    Skin:  No jaundice, rashes, or lesions    Lymph nodes:  No palpable cervical lymphadenopathy        Lab Results:   No visits with results within 1 Day(s) from this visit     Latest known visit with results is:   Admission on 11/30/2022, Discharged on 12/01/2022   Component Date Value   • WBC 11/30/2022 5 22    • RBC 11/30/2022 4 54    • Hemoglobin 11/30/2022 12 9    • Hematocrit 11/30/2022 41 1    • MCV 11/30/2022 91    • MCH 11/30/2022 28 4    • MCHC 11/30/2022 31 4    • RDW 11/30/2022 14 6    • MPV 11/30/2022 9 3    • Platelets 62/72/1040 296    • nRBC 11/30/2022 0    • Neutrophils Relative 11/30/2022 58    • Immat GRANS % 11/30/2022 0    • Lymphocytes Relative 11/30/2022 33    • Monocytes Relative 11/30/2022 8    • Eosinophils Relative 11/30/2022 1    • Basophils Relative 11/30/2022 0    • Neutrophils Absolute 11/30/2022 2 95    • Immature Grans Absolute 11/30/2022 0 01    • Lymphocytes Absolute 11/30/2022 1 74    • Monocytes Absolute 11/30/2022 0 43    • Eosinophils Absolute 11/30/2022 0 07    • Basophils Absolute 11/30/2022 0 02    • Sodium 11/30/2022 138    • Potassium 11/30/2022 3 9    • Chloride 11/30/2022 103    • CO2 11/30/2022 26    • ANION GAP 11/30/2022 9    • BUN 11/30/2022 12    • Creatinine 11/30/2022 0 78    • Glucose 11/30/2022 100    • Calcium 11/30/2022 9 5    • AST 11/30/2022 19    • ALT 11/30/2022 23    • Alkaline Phosphatase 11/30/2022 97    • Total Protein 11/30/2022 8 1    • Albumin 11/30/2022 3 8    • Total Bilirubin 11/30/2022 0 30    • eGFR 11/30/2022 82    • hs TnI 0hr 11/30/2022 6    • Lipase 11/30/2022 76    • Ventricular Rate 11/30/2022 68    • Atrial Rate 11/30/2022 68 • [place] Interval 11/30/2022 174    • QRSD Interval 11/30/2022 70    • QT Interval 11/30/2022 408    • QTC Interval 11/30/2022 433    • P Axis 11/30/2022 66    • QRS Axis 11/30/2022 1    • T Wave [place] 11/30/2022 36          Radiology Results:   CT chest abdomen pelvis w contrast    Result Date: 11/30/2022  Narrative: CT CHEST, ABDOMEN AND PELVIS WITH IV CONTRAST INDICATION:   upper abd pain, dysphagia  COMPARISON:  None  TECHNIQUE: CT examination of the chest, abdomen and pelvis was performed  Axial, sagittal, and coronal 2D reformatted images were created from the source data and submitted for interpretation  Radiation dose length product (DLP) for this visit:  591 mGy-cm   This examination, like all CT scans performed in the [facility], was performed utilizing techniques to minimize radiation dose exposure, including the use of iterative reconstruction and automated exposure control  IV Contrast:  100 mL of iohexol (OMNIPAQUE) Enteric Contrast: Enteric contrast was administered  FINDINGS: CHEST LUNGS:  Lungs are clear  There is no tracheal or endobronchial lesion  PLEURA:  Unremarkable  HEART/GREAT VESSELS: Heart is unremarkable for patient's age  No thoracic aortic aneurysm  MEDIASTINUM AND BELTRAN:  Unremarkable  CHEST WALL AND LOWER NECK:  Unremarkable  ABDOMEN LIVER/BILIARY TREE:  Unremarkable  GALLBLADDER:  No calcified gallstones  No pericholecystic inflammatory change  SPLEEN:  Unremarkable  PANCREAS:  Unremarkable  ADRENAL GLANDS: There is a 1 4 cm left adrenal nodule  In patients with no cancer history and new/enlarging adrenal nodule, recommend adrenal mass protocol CT to characterize, and biochemical evaluation and depending on rate of growth, surgical resection  (without biopsy) to treat possible adrenal cortical carcinoma may be warranted     Adrenal recommendation based on institutional consensus and Journal of [employer] of Radiology 2017;14:2140-9605 KIDNEYS/URETERS: Fat-containing lesion measuring 1 9 cm in the right kidney likely representing a angiomyolipoma  STOMACH AND BOWEL:  Unremarkable  APPENDIX:  No findings to suggest appendicitis  ABDOMINOPELVIC CAVITY:  No ascites  No pneumoperitoneum  No lymphadenopathy  VESSELS:  Unremarkable for patient's age  PELVIS REPRODUCTIVE ORGANS:  Markedly enlarged uterus with multiple hypodense masses  Fluid within the endometrium  URINARY BLADDER:  Unremarkable  ABDOMINAL WALL/INGUINAL REGIONS:  Unremarkable  OSSEOUS STRUCTURES:  No acute fracture or destructive osseous lesion  Impression: Markedly enlarged uterus with multiple hypodense masses and fluid within the endometrium  Further evaluation with pelvic sonogram is recommended  Fat-containing right renal lesion likely representing a angiomyolipoma The study was marked in EPIC for immediate notification   Workstation performed: [id number]

## 2022-12-08 NOTE — PATIENT INSTRUCTIONS
Scheduled date of EGD(as of today):12/15/22  Physician performing EGD:Surjit  Location of EGD:[place]  Instructions reviewed with patient by:Kee garza  Clearances:  none

## 2023-02-23 ENCOUNTER — TELEPHONE (OUTPATIENT)
Dept: GASTROENTEROLOGY | Facility: CLINIC | Age: 63
End: 2023-02-23

## 2023-02-23 NOTE — TELEPHONE ENCOUNTER
Attempted to call and confirm EGD for 2/27 with Dr Justin Watson - patient not excepting phone calls at this time

## 2023-05-08 RX ORDER — MELOXICAM 15 MG/1
TABLET ORAL
COMMUNITY
Start: 2023-02-10 | End: 2023-05-10

## 2023-05-10 ENCOUNTER — OFFICE VISIT (OUTPATIENT)
Dept: GASTROENTEROLOGY | Facility: CLINIC | Age: 63
End: 2023-05-10

## 2023-05-10 VITALS
SYSTOLIC BLOOD PRESSURE: 111 MMHG | BODY MASS INDEX: 29.37 KG/M2 | OXYGEN SATURATION: 99 % | WEIGHT: 172 LBS | HEIGHT: 64 IN | DIASTOLIC BLOOD PRESSURE: 69 MMHG | HEART RATE: 84 BPM

## 2023-05-10 DIAGNOSIS — R14.0 BLOATING: Primary | ICD-10-CM

## 2023-05-10 DIAGNOSIS — R13.19 ESOPHAGEAL DYSPHAGIA: ICD-10-CM

## 2023-05-10 DIAGNOSIS — R19.8 BORBORYGMI: ICD-10-CM

## 2023-05-10 RX ORDER — BLOOD SUGAR DIAGNOSTIC
STRIP MISCELLANEOUS
COMMUNITY
Start: 2023-05-04 | End: 2023-05-10

## 2023-05-10 NOTE — PATIENT INSTRUCTIONS
Scheduled date of EGD(as of today):7/12/23  Physician performing EGD:Surjit  Location of EGD:[place]  Instructions reviewed with patient by:Kee garza  Clearances:  none

## 2023-05-10 NOTE — PROGRESS NOTES
[facility] - Outpatient Follow-up Note  Leila Etienne 63 y o  female MRN: [id number]  Encounter: [id number]          ASSESSMENT AND PLAN:      1  Bloating  2  Borborygmi  New in the past month  No associated pain or change in her bowel movements  Advised trial of a probiotic  Reviewed limiting gas producing foods    3  Esophageal dysphagia  History of this with improvement status post EGD with dilation years ago  Repeat EGD    ______________________________________________________________________    SUBJECTIVE: 46-year-old female with a history of recurrent esophageal dysphagia and GERD who presents for follow-up  She was seen in December by Dr Estrada Hare with complaints of dysphagia to solids  She was advised undergo an EGD however she had to reschedule this and has not yet had it done  She reports that this is still problematic off and on but is not severe at this point  She made this appointment today because over the past month she has been having problems with gaseousness and growling in her stomach  She has never had this before  There is no associated change in her bowel movements  She has no nausea or vomiting  She has had no change in her diet or new medications  She has no significant abdominal pain  She notes that her last colonoscopy was in 2020  She reports that she had several polyps removed and was told to follow-up in 5 years for repeat exam       REVIEW OF SYSTEMS IS OTHERWISE NEGATIVE        Historical Information   Past Medical History:   Diagnosis Date   • Chronic kidney disease      Past Surgical History:   Procedure Laterality Date   • KNEE SURGERY       Social History   Social History     Substance and Sexual Activity   Alcohol Use Not Currently     Social History     Substance and Sexual Activity   Drug Use Never     Social History     Tobacco Use   Smoking Status Former   • Packs/day: 0 25   • Types: Cigarettes   Smokeless Tobacco Never     Family "History   Problem Relation Age of Onset   • No Known Problems Mother    • Dementia Father    • Alzheimer's disease Father    • Leukemia Father        Meds/Allergies       Current Outpatient Medications:   •  ascorbic Acid (VITAMIN C) 500 MG CPCR  •  ipratropium (ATROVENT) 0 06 % nasal spray    Allergies   Allergen Reactions   • Penicillins Rash           Objective     Blood pressure 111/69, pulse 84, height 5' 4\" (1 626 m), weight 78 kg (172 lb), SpO2 99 %  Body mass index is 29 52 kg/m²  PHYSICAL EXAM:      General Appearance:   Alert, cooperative, no distress   HEENT:   Normocephalic, atraumatic, anicteric      Neck:  Supple, symmetrical, trachea midline   Lungs:   Clear to auscultation bilaterally; no rales, rhonchi or wheezing; respirations unlabored    Heart[de-identified]   Regular rate and rhythm; no murmur, rub, or gallop  Abdomen:   Soft, non-tender, non-distended; normal bowel sounds; no masses, no organomegaly    Genitalia:   Deferred    Rectal:   Deferred    Extremities:  No cyanosis, clubbing or edema    Pulses:  2+ and symmetric    Skin:  No jaundice, rashes, or lesions    Lymph nodes:  No palpable cervical lymphadenopathy        Lab Results:   No visits with results within 1 Day(s) from this visit     Latest known visit with results is:   Admission on 11/30/2022, Discharged on 12/01/2022   Component Date Value   • WBC 11/30/2022 5 22    • RBC 11/30/2022 4 54    • Hemoglobin 11/30/2022 12 9    • Hematocrit 11/30/2022 41 1    • MCV 11/30/2022 91    • MCH 11/30/2022 28 4    • MCHC 11/30/2022 31 4    • RDW 11/30/2022 14 6    • MPV 11/30/2022 9 3    • Platelets 66/00/2223 296    • nRBC 11/30/2022 0    • Neutrophils Relative 11/30/2022 58    • Immat GRANS % 11/30/2022 0    • Lymphocytes Relative 11/30/2022 33    • Monocytes Relative 11/30/2022 8    • Eosinophils Relative 11/30/2022 1    • Basophils Relative 11/30/2022 0    • Neutrophils Absolute 11/30/2022 2 95    • Immature Grans Absolute 11/30/2022 0 01    • " Lymphocytes Absolute 11/30/2022 1 74    • Monocytes Absolute 11/30/2022 0 43    • Eosinophils Absolute 11/30/2022 0 07    • Basophils Absolute 11/30/2022 0 02    • Sodium 11/30/2022 138    • Potassium 11/30/2022 3 9    • Chloride 11/30/2022 103    • CO2 11/30/2022 26    • ANION GAP 11/30/2022 9    • BUN 11/30/2022 12    • Creatinine 11/30/2022 0 78    • Glucose 11/30/2022 100    • Calcium 11/30/2022 9 5    • AST 11/30/2022 19    • ALT 11/30/2022 23    • Alkaline Phosphatase 11/30/2022 97    • Total Protein 11/30/2022 8 1    • Albumin 11/30/2022 3 8    • Total Bilirubin 11/30/2022 0 30    • eGFR 11/30/2022 82    • hs TnI 0hr 11/30/2022 6    • Lipase 11/30/2022 76    • Ventricular Rate 11/30/2022 68    • Atrial Rate 11/30/2022 68    • CA Interval 11/30/2022 174    • QRSD Interval 11/30/2022 70    • QT Interval 11/30/2022 408    • QTC Interval 11/30/2022 433    • P Axis 11/30/2022 66    • QRS Axis 11/30/2022 1    • T Wave [place] 11/30/2022 36          Radiology Results:   No results found

## 2023-07-11 RX ORDER — SODIUM CHLORIDE, SODIUM LACTATE, POTASSIUM CHLORIDE, CALCIUM CHLORIDE 600; 310; 30; 20 MG/100ML; MG/100ML; MG/100ML; MG/100ML
125 INJECTION, SOLUTION INTRAVENOUS CONTINUOUS
Status: CANCELLED | OUTPATIENT
Start: 2023-07-11

## 2023-07-12 ENCOUNTER — HOSPITAL ENCOUNTER (OUTPATIENT)
Dept: GASTROENTEROLOGY | Facility: HOSPITAL | Age: 63
Setting detail: OUTPATIENT SURGERY
Discharge: HOME | End: 2023-07-12
Payer: COMMERCIAL

## 2023-07-12 ENCOUNTER — ANESTHESIA EVENT (OUTPATIENT)
Dept: GASTROENTEROLOGY | Facility: HOSPITAL | Age: 63
End: 2023-07-12

## 2023-07-12 ENCOUNTER — ANESTHESIA (OUTPATIENT)
Dept: GASTROENTEROLOGY | Facility: HOSPITAL | Age: 63
End: 2023-07-12

## 2023-07-12 VITALS
OXYGEN SATURATION: 99 % | RESPIRATION RATE: 18 BRPM | DIASTOLIC BLOOD PRESSURE: 57 MMHG | WEIGHT: 172.84 LBS | BODY MASS INDEX: 29.51 KG/M2 | TEMPERATURE: 97.4 F | HEART RATE: 55 BPM | HEIGHT: 64 IN | SYSTOLIC BLOOD PRESSURE: 116 MMHG

## 2023-07-12 DIAGNOSIS — R13.19 ESOPHAGEAL DYSPHAGIA: ICD-10-CM

## 2023-07-12 DIAGNOSIS — R14.0 BLOATING: ICD-10-CM

## 2023-07-12 PROBLEM — N18.9 CHRONIC KIDNEY DISEASE: Status: ACTIVE | Noted: 2023-07-12

## 2023-07-12 PROBLEM — Z72.89 ENGAGES IN VAPING: Status: ACTIVE | Noted: 2023-07-12

## 2023-07-12 PROCEDURE — 88305 TISSUE EXAM BY PATHOLOGIST: CPT | Performed by: PATHOLOGY

## 2023-07-12 PROCEDURE — 43248 EGD GUIDE WIRE INSERTION: CPT | Performed by: INTERNAL MEDICINE

## 2023-07-12 PROCEDURE — 43239 EGD BIOPSY SINGLE/MULTIPLE: CPT | Performed by: INTERNAL MEDICINE

## 2023-07-12 PROCEDURE — 88342 IMHCHEM/IMCYTCHM 1ST ANTB: CPT | Performed by: PATHOLOGY

## 2023-07-12 RX ORDER — FENTANYL CITRATE 50 UG/ML
INJECTION, SOLUTION INTRAMUSCULAR; INTRAVENOUS AS NEEDED
Status: DISCONTINUED | OUTPATIENT
Start: 2023-07-12 | End: 2023-07-12

## 2023-07-12 RX ORDER — LIDOCAINE HYDROCHLORIDE 20 MG/ML
INJECTION, SOLUTION EPIDURAL; INFILTRATION; INTRACAUDAL; PERINEURAL AS NEEDED
Status: DISCONTINUED | OUTPATIENT
Start: 2023-07-12 | End: 2023-07-12

## 2023-07-12 RX ORDER — GLYCOPYRROLATE 0.2 MG/ML
INJECTION INTRAMUSCULAR; INTRAVENOUS AS NEEDED
Status: DISCONTINUED | OUTPATIENT
Start: 2023-07-12 | End: 2023-07-12

## 2023-07-12 RX ORDER — SODIUM CHLORIDE, SODIUM LACTATE, POTASSIUM CHLORIDE, CALCIUM CHLORIDE 600; 310; 30; 20 MG/100ML; MG/100ML; MG/100ML; MG/100ML
INJECTION, SOLUTION INTRAVENOUS CONTINUOUS PRN
Status: DISCONTINUED | OUTPATIENT
Start: 2023-07-12 | End: 2023-07-12

## 2023-07-12 RX ORDER — PROPOFOL 10 MG/ML
INJECTION, EMULSION INTRAVENOUS AS NEEDED
Status: DISCONTINUED | OUTPATIENT
Start: 2023-07-12 | End: 2023-07-12

## 2023-07-12 RX ADMIN — PROPOFOL 20 MG: 10 INJECTION, EMULSION INTRAVENOUS at 13:39

## 2023-07-12 RX ADMIN — PROPOFOL 50 MG: 10 INJECTION, EMULSION INTRAVENOUS at 13:41

## 2023-07-12 RX ADMIN — LIDOCAINE HYDROCHLORIDE 100 MG: 20 INJECTION, SOLUTION EPIDURAL; INFILTRATION; INTRACAUDAL; PERINEURAL at 13:38

## 2023-07-12 RX ADMIN — SODIUM CHLORIDE, SODIUM LACTATE, POTASSIUM CHLORIDE, AND CALCIUM CHLORIDE: .6; .31; .03; .02 INJECTION, SOLUTION INTRAVENOUS at 13:32

## 2023-07-12 RX ADMIN — PROPOFOL 100 MG: 10 INJECTION, EMULSION INTRAVENOUS at 13:38

## 2023-07-12 RX ADMIN — PROPOFOL 30 MG: 10 INJECTION, EMULSION INTRAVENOUS at 13:43

## 2023-07-12 RX ADMIN — GLYCOPYRROLATE 0.2 MG: 0.2 INJECTION, SOLUTION INTRAMUSCULAR; INTRAVENOUS at 13:32

## 2023-07-12 RX ADMIN — FENTANYL CITRATE 25 MCG: 50 INJECTION INTRAMUSCULAR; INTRAVENOUS at 13:38

## 2023-07-12 NOTE — ANESTHESIA POSTPROCEDURE EVALUATION
Post-Op Assessment Note    CV Status:  Stable  Pain Score: 0    Pain management: adequate     Mental Status:  Awake and sleepy   Hydration Status:  Euvolemic   PONV Controlled:  Controlled   Airway Patency:  Patent and adequate   Two or more mitigation strategies used for obstructive sleep apnea   Post Op Vitals Reviewed: Yes      Staff: CRNA         No notable events documented.     BP   95/45   Temp      Pulse  58   Resp   20   SpO2   100

## 2023-07-12 NOTE — H&P
History and Physical -  [facility]  Aleksandr Smith 63 y.o. female MRN: [id number]      HPI: Aleksandr Smith is a 63y.o. year old female who presents for bloating and dysphagia        REVIEW OF SYSTEMS: Per the HPI, and otherwise unremarkable. Historical Information   Past Medical History:   Diagnosis Date   • Chronic kidney disease      Past Surgical History:   Procedure Laterality Date   • JOINT REPLACEMENT Bilateral     knee   • KNEE SURGERY Bilateral      Social History   Social History     Substance and Sexual Activity   Alcohol Use Not Currently     Social History     Substance and Sexual Activity   Drug Use Never     Social History     Tobacco Use   Smoking Status Former   • Packs/day: 0.25   • Types: Cigarettes   Smokeless Tobacco Never     Family History   Problem Relation Age of Onset   • No Known Problems Mother    • Dementia Father    • Alzheimer's disease Father    • Leukemia Father        Meds/Allergies     (Not in a hospital admission)      Allergies   Allergen Reactions   • Penicillins Rash       Objective     Blood pressure 128/63, pulse 61, temperature 97.8 °F (36.6 °C), temperature source Temporal, resp. rate 15, height 5' 4" (1.626 m), weight 78.4 kg (172 lb 13.5 oz), SpO2 96 %. PHYSICAL EXAM    Gen: NAD  CV: RRR  CHEST: Clear  ABD: soft, NT/ND  EXT: no edema      ASSESSMENT/PLAN:  This is a 63y.o. year old female here for egd with dilation, and she is stable and optimized for her procedure.

## 2023-07-12 NOTE — ANESTHESIA PREPROCEDURE EVALUATION
Procedure:  EGD    Relevant Problems   ANESTHESIA (within normal limits)   (-) History of anesthesia complications      CARDIO (within normal limits)      ENDO (within normal limits)      GI/HEPATIC  Confirmed NPO appropriate      /RENAL   (+) Chronic kidney disease      HEMATOLOGY (within normal limits)      MUSCULOSKELETAL (within normal limits)      NEURO/PSYCH (within normal limits)      PULMONARY (within normal limits)   (-) URI (upper respiratory infection)      Other   (+) Engages in vaping        Physical Exam    Airway    Mallampati score: I  TM Distance: >3 FB  Neck ROM: full     Dental       Cardiovascular  Rhythm: regular, Rate: normal,     Pulmonary  Breath sounds clear to auscultation,     Other Findings        Anesthesia Plan  ASA Score- 2     Anesthesia Type- IV sedation with anesthesia with ASA Monitors. Additional Monitors:   Airway Plan:     Comment: I discussed the risks and benefits of IV sedation anesthesia including the possibility of the need to convert to general anesthesia and the potential risk of awareness. The patient was given the opportunity to ask questions, which were answered. .       Plan Factors-Exercise tolerance (METS): >4 METS. Chart reviewed. Patient is a current smoker (vaping). Patient smoked on day of surgery. Induction- intravenous. Postoperative Plan-     Informed Consent- Anesthetic plan and risks discussed with patient. I personally reviewed this patient with the CRNA. Discussed and agreed on the Anesthesia Plan with the CRNA. Lupe Morris

## 2023-07-18 PROCEDURE — 88342 IMHCHEM/IMCYTCHM 1ST ANTB: CPT | Performed by: PATHOLOGY

## 2023-07-18 PROCEDURE — 88305 TISSUE EXAM BY PATHOLOGIST: CPT | Performed by: PATHOLOGY

## 2023-07-21 ENCOUNTER — TELEPHONE (OUTPATIENT)
Dept: GASTROENTEROLOGY | Facility: CLINIC | Age: 63
End: 2023-07-21

## 2023-07-21 NOTE — TELEPHONE ENCOUNTER
Called and spoke with patient in regards to her biopsy results as per Dr. Kayla Pacheco. Pt voiced understanding.

## 2023-07-21 NOTE — TELEPHONE ENCOUNTER
----- Message from Belia Morton DO sent at 7/21/2023  7:51 AM EDT -----  The results were given to the patient's MyChart. Biopsies of the stomach were benign.

## 2023-08-13 ENCOUNTER — HOSPITAL ENCOUNTER (EMERGENCY)
Facility: HOSPITAL | Age: 63
Discharge: HOME | End: 2023-08-13
Admitting: EMERGENCY MEDICINE
Payer: COMMERCIAL

## 2023-08-13 VITALS
BODY MASS INDEX: 29.37 KG/M2 | TEMPERATURE: 97.8 F | HEART RATE: 65 BPM | WEIGHT: 172 LBS | HEIGHT: 64 IN | RESPIRATION RATE: 18 BRPM | SYSTOLIC BLOOD PRESSURE: 138 MMHG | OXYGEN SATURATION: 99 % | DIASTOLIC BLOOD PRESSURE: 62 MMHG

## 2023-08-13 DIAGNOSIS — M54.50 ACUTE RIGHT-SIDED LOW BACK PAIN WITHOUT SCIATICA: Primary | ICD-10-CM

## 2023-08-13 LAB
ANION GAP SERPL CALCULATED.3IONS-SCNC: 4 MMOL/L
BASOPHILS # BLD MANUAL: 0 THOUSAND/UL (ref 0–0.1)
BASOPHILS NFR MAR MANUAL: 0 % (ref 0–1)
BUN SERPL-MCNC: 20 MG/DL (ref 5–25)
CALCIUM SERPL-MCNC: 9.4 MG/DL (ref 8.4–10.2)
CARDIAC TROPONIN I PNL SERPL HS: 4 NG/L
CHLORIDE SERPL-SCNC: 107 MMOL/L (ref 96–108)
CO2 SERPL-SCNC: 28 MMOL/L (ref 21–32)
CREAT SERPL-MCNC: 0.75 MG/DL (ref 0.6–1.3)
EOSINOPHIL # BLD MANUAL: 0 THOUSAND/UL (ref 0–0.4)
EOSINOPHIL NFR BLD MANUAL: 0 % (ref 0–6)
ERYTHROCYTE [DISTWIDTH] IN BLOOD BY AUTOMATED COUNT: 14.7 % (ref 11.6–15.1)
GFR SERPL CREATININE-BSD FRML MDRD: 86 ML/MIN/1.73SQ M
GLUCOSE SERPL-MCNC: 91 MG/DL (ref 65–140)
HCT VFR BLD AUTO: 41 % (ref 34.8–46.1)
HGB BLD-MCNC: 13.1 G/DL (ref 11.5–15.4)
LYMPHOCYTES # BLD AUTO: 2.81 THOUSAND/UL (ref 0.6–4.47)
LYMPHOCYTES # BLD AUTO: 57 % (ref 14–44)
MCH RBC QN AUTO: 28.7 PG (ref 26.8–34.3)
MCHC RBC AUTO-ENTMCNC: 32 G/DL (ref 31.4–37.4)
MCV RBC AUTO: 90 FL (ref 82–98)
MONOCYTES # BLD AUTO: 0.25 THOUSAND/UL (ref 0–1.22)
MONOCYTES NFR BLD: 5 % (ref 4–12)
NEUTROPHILS # BLD MANUAL: 1.87 THOUSAND/UL (ref 1.85–7.62)
NEUTS SEG NFR BLD AUTO: 38 % (ref 43–75)
PLATELET # BLD AUTO: 286 THOUSANDS/UL (ref 149–390)
PLATELET BLD QL SMEAR: ADEQUATE
PMV BLD AUTO: 9.1 FL (ref 8.9–12.7)
POTASSIUM SERPL-SCNC: 4 MMOL/L (ref 3.5–5.3)
RBC # BLD AUTO: 4.56 MILLION/UL (ref 3.81–5.12)
SODIUM SERPL-SCNC: 139 MMOL/L (ref 135–147)
TARGETS BLD QL SMEAR: PRESENT
WBC # BLD AUTO: 4.93 THOUSAND/UL (ref 4.31–10.16)

## 2023-08-13 PROCEDURE — 80048 BASIC METABOLIC PNL TOTAL CA: CPT | Performed by: NURSE PRACTITIONER

## 2023-08-13 PROCEDURE — 85027 COMPLETE CBC AUTOMATED: CPT | Performed by: NURSE PRACTITIONER

## 2023-08-13 PROCEDURE — 96374 THER/PROPH/DIAG INJ IV PUSH: CPT

## 2023-08-13 PROCEDURE — 99284 EMERGENCY DEPT VISIT MOD MDM: CPT | Performed by: NURSE PRACTITIONER

## 2023-08-13 PROCEDURE — 84484 ASSAY OF TROPONIN QUANT: CPT | Performed by: NURSE PRACTITIONER

## 2023-08-13 PROCEDURE — 36415 COLL VENOUS BLD VENIPUNCTURE: CPT | Performed by: NURSE PRACTITIONER

## 2023-08-13 PROCEDURE — 85007 BL SMEAR W/DIFF WBC COUNT: CPT | Performed by: NURSE PRACTITIONER

## 2023-08-13 PROCEDURE — 93005 ELECTROCARDIOGRAM TRACING: CPT

## 2023-08-13 PROCEDURE — 96375 TX/PRO/DX INJ NEW DRUG ADDON: CPT

## 2023-08-13 PROCEDURE — 99283 EMERGENCY DEPT VISIT LOW MDM: CPT

## 2023-08-13 RX ORDER — NAPROXEN 500 MG/1
500 TABLET ORAL 2 TIMES DAILY WITH MEALS
Qty: 30 TABLET | Refills: 0 | Status: SHIPPED | OUTPATIENT
Start: 2023-08-13

## 2023-08-13 RX ORDER — MORPHINE SULFATE 4 MG/ML
4 INJECTION, SOLUTION INTRAMUSCULAR; INTRAVENOUS ONCE
Status: COMPLETED | OUTPATIENT
Start: 2023-08-13 | End: 2023-08-13

## 2023-08-13 RX ORDER — KETOROLAC TROMETHAMINE 30 MG/ML
15 INJECTION, SOLUTION INTRAMUSCULAR; INTRAVENOUS ONCE
Status: COMPLETED | OUTPATIENT
Start: 2023-08-13 | End: 2023-08-13

## 2023-08-13 RX ORDER — HYDROCODONE BITARTRATE AND ACETAMINOPHEN 5; 325 MG/1; MG/1
1 TABLET ORAL EVERY 6 HOURS PRN
Qty: 12 TABLET | Refills: 0 | Status: SHIPPED | OUTPATIENT
Start: 2023-08-13

## 2023-08-13 RX ADMIN — KETOROLAC TROMETHAMINE 15 MG: 30 INJECTION, SOLUTION INTRAMUSCULAR; INTRAVENOUS at 06:55

## 2023-08-13 RX ADMIN — MORPHINE SULFATE 4 MG: 4 INJECTION INTRAVENOUS at 06:55

## 2023-08-13 NOTE — ED PROVIDER NOTES
History  Chief Complaint   Patient presents with   • Back Pain     Pt arrives ambulatory with a c/o right sided lower back pain that radiates down her right leg x 1 week     65-year-old female presenting here with a chief complaint of right-sided lower back pain with some radiation down the lateral portion of her right leg to about the level of the knee. She denies any specific injury. She has some reproducible tenderness over the sacroiliac joint she has a positive Avtar test.  Negative straight leg raise. Also concerned because she had a reportedly abnormal EKG at her primary care office and needs cardiac clearance. She denies any chest pain or shortness of breath. Prior to Admission Medications   Prescriptions Last Dose Informant Patient Reported? Taking?   ascorbic Acid (VITAMIN C) 500 MG CPCR  Self Yes No   Sig: Take 500 mg by mouth   ipratropium (ATROVENT) 0.06 % nasal spray  Self Yes No   Si sprays into each nostril      Facility-Administered Medications: None       Past Medical History:   Diagnosis Date   • Chronic kidney disease        Past Surgical History:   Procedure Laterality Date   • JOINT REPLACEMENT Bilateral     knee   • KNEE SURGERY Bilateral        Family History   Problem Relation Age of Onset   • No Known Problems Mother    • Dementia Father    • Alzheimer's disease Father    • Leukemia Father      I have reviewed and agree with the history as documented. E-Cigarette/Vaping   • E-Cigarette Use Current Every Day User      E-Cigarette/Vaping Substances   • Nicotine Yes    • THC Yes    • CBD No    • Flavoring No    • Unknown No      Social History     Tobacco Use   • Smoking status: Former     Packs/day: 0.25     Types: Cigarettes     Quit date: 2021     Years since quittin.1   • Smokeless tobacco: Never   Vaping Use   • Vaping Use: Every day   • Substances: Nicotine, THC   Substance Use Topics   • Alcohol use:  Yes     Alcohol/week: 2.0 standard drinks of alcohol Types: 2 Glasses of wine per week   • Drug use: Never       Review of Systems   Constitutional: Negative for diaphoresis, fatigue and fever. HENT: Negative for congestion, ear pain, nosebleeds and sore throat. Eyes: Negative for photophobia, pain, discharge and visual disturbance. Respiratory: Negative for cough, choking, chest tightness, shortness of breath and wheezing. Cardiovascular: Negative for chest pain and palpitations. Gastrointestinal: Negative for abdominal distention, abdominal pain, diarrhea and vomiting. Genitourinary: Negative for dysuria, flank pain and frequency. Musculoskeletal: Positive for back pain. Negative for gait problem and joint swelling. Skin: Negative for color change and rash. Neurological: Negative for dizziness, syncope and headaches. Psychiatric/Behavioral: Negative for behavioral problems and confusion. The patient is not nervous/anxious. All other systems reviewed and are negative. Physical Exam  Physical Exam  Vitals and nursing note reviewed. Constitutional:       General: She is not in acute distress. Appearance: She is well-developed. She is not ill-appearing or toxic-appearing. HENT:      Head: Normocephalic and atraumatic. Mouth/Throat:      Dentition: Normal dentition. Eyes:      General:         Right eye: No discharge. Left eye: No discharge. Cardiovascular:      Rate and Rhythm: Normal rate and regular rhythm. Pulmonary:      Effort: Pulmonary effort is normal. No accessory muscle usage or respiratory distress. Abdominal:      General: There is no distension. Tenderness: There is no guarding. Musculoskeletal:         General: Normal range of motion. Cervical back: Normal range of motion and neck supple. Lumbar back: Tenderness present. No bony tenderness.  Negative right straight leg raise test and negative left straight leg raise test.      Comments: Positive Avtar test positive tenderness over the right sacroiliac joint   Skin:     General: Skin is warm and dry. Neurological:      Mental Status: She is alert and oriented to person, place, and time. Coordination: Coordination normal.   Psychiatric:         Behavior: Behavior is cooperative. Vital Signs  ED Triage Vitals [08/13/23 0623]   Temperature Pulse Respirations Blood Pressure SpO2   97.8 °F (36.6 °C) 68 18 (!) 180/74 100 %      Temp src Heart Rate Source Patient Position - Orthostatic VS BP Location FiO2 (%)   -- Monitor Sitting Left arm --      Pain Score       6           Vitals:    08/13/23 0623 08/13/23 0700   BP: (!) 180/74 138/62   Pulse: 68 65   Patient Position - Orthostatic VS: Sitting Lying         Visual Acuity      ED Medications  Medications   ketorolac (TORADOL) injection 15 mg (15 mg Intravenous Given 8/13/23 0655)   morphine injection 4 mg (4 mg Intravenous Given 8/13/23 0655)       Diagnostic Studies  Results Reviewed     Procedure Component Value Units Date/Time    RBC Morphology Reflex Test [[id number]] Collected: 08/13/23 0656    Lab Status: Final result Specimen: Blood from Arm, Right Updated: 08/13/23 0901    CBC and differential [[id number]]  (Normal) Collected: 08/13/23 0656    Lab Status: Final result Specimen: Blood from Arm, Right Updated: 08/13/23 0804     WBC 4.93 Thousand/uL      RBC 4.56 Million/uL      Hemoglobin 13.1 g/dL      Hematocrit 41.0 %      MCV 90 fL      MCH 28.7 pg      MCHC 32.0 g/dL      RDW 14.7 %      MPV 9.1 fL      Platelets 004 Thousands/uL     Narrative: This is an appended report. These results have been appended to a previously verified report.     Manual Differential(PHLEBS Do Not Order) [[id number]]  (Abnormal) Collected: 08/13/23 0656    Lab Status: Final result Specimen: Blood from Arm, Right Updated: 08/13/23 0804     Segmented % 38 %      Lymphocytes % 57 %      Monocytes % 5 %      Eosinophils, % 0 %      Basophils % 0 %      Absolute Neutrophils 1.87 Thousand/uL Lymphocytes Absolute 2.81 Thousand/uL      Monocytes Absolute 0.25 Thousand/uL      Eosinophils Absolute 0.00 Thousand/uL      Basophils Absolute 0.00 Thousand/uL      Total Counted --     Platelet Estimate Adequate     Target Cells Present    HS Troponin 0hr (reflex protocol) [[id number]]  (Normal) Collected: 08/13/23 0656    Lab Status: Final result Specimen: Blood from Arm, Right Updated: 08/13/23 0731     hs TnI 0hr 4 ng/L     Basic metabolic panel [[id number]] Collected: 08/13/23 0656    Lab Status: Final result Specimen: Blood from Arm, Right Updated: 08/13/23 9586     Sodium 139 mmol/L      Potassium 4.0 mmol/L      Chloride 107 mmol/L      CO2 28 mmol/L      ANION GAP 4 mmol/L      BUN 20 mg/dL      Creatinine 0.75 mg/dL      Glucose 91 mg/dL      Calcium 9.4 mg/dL      eGFR 86 ml/min/1.73sq m     Narrative:      [facility] guidelines for Chronic Kidney Disease (CKD):   •  Stage 1 with normal or high GFR (GFR > 90 mL/min/1.73 square meters)  •  Stage 2 Mild CKD (GFR = 60-89 mL/min/1.73 square meters)  •  Stage 3A Moderate CKD (GFR = 45-59 mL/min/1.73 square meters)  •  Stage 3B Moderate CKD (GFR = 30-44 mL/min/1.73 square meters)  •  Stage 4 Severe CKD (GFR = 15-29 mL/min/1.73 square meters)  •  Stage 5 End Stage CKD (GFR <15 mL/min/1.73 square meters)  Note: GFR calculation is accurate only with a steady state creatinine                 No orders to display              Procedures  Procedures         ED Course                                             Medical Decision Making  Acute right-sided low back pain without any concerning features. Patient's EKG is unremarkable with normal sinus rhythm and some PACs. Recommend follow-up with primary care    Acute right-sided low back pain without sciatica: acute illness or injury  Amount and/or Complexity of Data Reviewed  Labs: ordered. Risk  Prescription drug management.           Disposition  Final diagnoses:   Acute right-sided low back pain without sciatica     Time reflects when diagnosis was documented in both MDM as applicable and the Disposition within this note     Time User Action Codes Description Comment    8/13/2023  6:59 AM Carol Vlales Add [M54.50] Acute right-sided low back pain without sciatica       ED Disposition     ED Disposition   Discharge    Condition   Stable    Date/Time   Sun Aug 13, 2023  6:59 AM    Comment   21 JOVANA Greco discharge to home/self care.                Follow-up Information     Follow up With Specialties Details Why Contact Info Additional Information    [address] [place] [address] Emergency Department Emergency Medicine  As needed [address] [facility] Emergency Department, [place], [place], [address]          Discharge Medication List as of 8/13/2023  7:11 AM      START taking these medications    Details   HYDROcodone-acetaminophen (NORCO) 5-325 mg per tablet Take 1 tablet by mouth every 6 (six) hours as needed for pain for up to 12 doses Max Daily Amount: 4 tablets, Starting Sun 8/13/2023, Normal      naproxen (Naprosyn) 500 mg tablet Take 1 tablet (500 mg total) by mouth 2 (two) times a day with meals, Starting Sun 8/13/2023, Normal         CONTINUE these medications which have NOT CHANGED    Details   ascorbic Acid (VITAMIN C) 500 MG CPCR Take 500 mg by mouth, Historical Med      ipratropium (ATROVENT) 0.06 % nasal spray 2 sprays into each nostril, Starting Fri 10/8/2021, Historical Med                 PDMP Review     None          ED Provider  Electronically Signed by           FIORELLA Baird  08/13/23 2191

## 2023-08-14 ENCOUNTER — NURSE TRIAGE (OUTPATIENT)
Dept: PHYSICAL THERAPY | Facility: OTHER | Age: 63
End: 2023-08-14

## 2023-08-14 DIAGNOSIS — M54.41 ACUTE RIGHT-SIDED LOW BACK PAIN WITH RIGHT-SIDED SCIATICA: Primary | ICD-10-CM

## 2023-08-14 NOTE — TELEPHONE ENCOUNTER
Patient called into [facility] today 08/14 due to back pain. Patient left v/m 8:35am.    Returned patient's call today 08/14 @9:10am.      Additional Information  • Negative: Is this related to a work injury? • Negative: Is this related to an MVA? • Negative: Are you currently recieving homecare services? Background - Initial Assessment  Clinical complaint: ED visit yesterday 08/13 due to Right-Sided Lower Back Pain. Hx of back spasm in the past. Patient states this new flare up started a week ago. It radiates to right hip and down to leg/knee. Patient states pain is constant, worse with movement or certain position. NKI. Not seeing a Dr for this pain. Saw an Orthopedic years ago in [facility] Patient described pain as aching.    Date of onset: a week ago (new flare up)  Frequency of pain: constant  Quality of pain: aching    Protocols used: [facility] COMPREHENSIVE SPINE PROGRAM PROTOCOL

## 2023-08-14 NOTE — TELEPHONE ENCOUNTER
Additional Information  • Negative: Has the patient had unexplained weight loss? • Negative: Does the patient have a fever? • Negative: Is the patient experiencing blood in sputum? • Negative: Is the patient experiencing urine retention? • Negative: Is the patient experiencing acute drop foot or paralysis? • Negative: Has the patient experienced major trauma? (fall from height, high speed collision, direct blow to spine) and is also experiencing nausea, light-headedness, or loss of consciousness? • Negative: Is this a chronic condition? Protocols used: SL AMB COMPREHENSIVE SPINE PROGRAM PROTOCOL    This RN did review in detail the Comprehensive Spine Program and what we can provide for their back pain. Patient is agreeable to being triaged by this RN and would like to proceed with Physical Therapy. Referral was placed for Physical Therapy at the [facility] site. Patients information was sent to the  to make evaluation appointment. Patient made aware that the PT office  will be calling to schedule the appointment. Patient was provided with the phone number to the PT office. No further questions and/or concerns were voiced by the patient at this time. Patient states understanding of the referral that was placed. Referral Closed.

## 2023-08-17 LAB
ATRIAL RATE: 64 BPM
P AXIS: 69 DEGREES
PR INTERVAL: 188 MS
QRS AXIS: 16 DEGREES
QRSD INTERVAL: 80 MS
QT INTERVAL: 442 MS
QTC INTERVAL: 455 MS
T WAVE AXIS: 30 DEGREES
VENTRICULAR RATE: 64 BPM

## 2023-08-17 PROCEDURE — 93010 ELECTROCARDIOGRAM REPORT: CPT | Performed by: INTERNAL MEDICINE

## 2023-08-31 ENCOUNTER — EVALUATION (OUTPATIENT)
Dept: PHYSICAL THERAPY | Facility: CLINIC | Age: 63
End: 2023-08-31
Payer: COMMERCIAL

## 2023-08-31 DIAGNOSIS — M54.41 ACUTE RIGHT-SIDED LOW BACK PAIN WITH RIGHT-SIDED SCIATICA: Primary | ICD-10-CM

## 2023-08-31 PROCEDURE — 97161 PT EVAL LOW COMPLEX 20 MIN: CPT | Performed by: PHYSICAL THERAPIST

## 2023-08-31 PROCEDURE — 97110 THERAPEUTIC EXERCISES: CPT | Performed by: PHYSICAL THERAPIST

## 2023-08-31 NOTE — LETTER
2023    Altagracia Salas    Patient: Ann López  YOB: 1960   Date of Visit: 2023      Dear Dr. Charis Welch:    One of your patients, Ann López, was referred to me by the [facility]'s Comprehensive Spine program.  Please see the evaluation summary attached below. If care is required beyond 30 days to help your patient reach their goals, I will send you a request for signature on the plan of care. If you have any questions or concerns, please don't hesitate to call. Sincerely,    Celine Rose, PT      Primary Care Provider:      I certify that I have read the below Plan of Care and certify the need for these services furnished under this plan of treatment while under my care. Melyssa Gaona  [address] [address]  Via Fax: [phone]           PT Evaluation     Today's date: 2023  Patient name: Ann López  : 1960  MRN: [id number]  Referring provider: Jessy Villagomez PT  Dx:   Encounter Diagnosis     ICD-10-CM    1. Acute right-sided low back pain with right-sided sciatica  M54.41 Ambulatory referral to PT spine                     Assessment  Assessment details: Patient was provided a home exercise program and demonstrated an understanding of exercises. Patient was advised to stop performing home exercise program if symptoms increase or new complaints developed. Verbal understanding demonstrated regarding home exercise program instructions. Patient would benefit from skilled physical therapy services for prescribed exercises, manual interventions, neuromuscular re-education, education, and modalities as deemed appropriate to assist patient in achieving their maximum level of function. Patient presents with c/o Right hip iliac crest/ lateral joint region pain/ discomfort intermittently x 3 weeks.      SHe presents thru the comprehensive spine program after going to ED 3 weeks ago due to pain and difficulty walking. Evaluation reveals:  Extension bias with ability to abolish her symptoms during session    She does present with loss of trunk mobility, (+) leg length discrepancy ( right short approx 1/2" ), mild Right hip weakness, decreased flexibility bilateral hips, limited knee flexion to 90 bilaterally due to TKA's. She has (+) tenderness right greater trochanteric region     Patient will be out of town x 2 week but will resume PT upon return. Impairments: abnormal gait, abnormal or restricted ROM, activity intolerance, impaired physical strength, lacks appropriate home exercise program and pain with function  Understanding of Dx/Px/POC: good  Goals  STG   1. Patient will demonstrate independence and competence with HEP 2 -4 weeks  2. Patient will report > 25-50% reduced pain 2-4 weeks    LTG   1. Patient will report improvements with both functional and recreational abilities  4-6 weeks  2. Patient will demonstrate improved motor function   4-6 weeks. 3.  Restoration of full / painfree trunk mobility  4-8 weeks  4. Right hip pain will be abolished per patient account  4-8 weeks. Plan  Plan details: Patient response to treatment will be monitored each session and progressed accordingly    Thank you for this referral.   Patient would benefit from: skilled physical therapy  Planned modality interventions: thermotherapy: hydrocollator packs  Planned therapy interventions: IADL retraining, joint mobilization, manual therapy, patient education, postural training, strengthening, stretching, therapeutic exercise, flexibility, home exercise program and neuromuscular re-education  Frequency: 2x week  Plan of Care expiration date: 10/31/2023  Treatment plan discussed with: patient        Subjective Evaluation    History of Present Illness  Mechanism of injury: Patient presents with c/o Right LS pain with right LE radicular pain - lateral thigh.    She woke with severe pain    Went to the ED  Given pain medications   Went to [facility]  Given cortizone injection into lateral thigh - "helped somewhat"  Referred to Orthopedic chiropractor which she has been attending regularly. Now - her pain is located right lateral thigh - denies back pain   During the daytime - as she is moving, she does better   Nighttime pain (+) with sleep disruption   Painful right sidelying. PMH R tka oct 2020 required manipulation  L tka  required manipulation   Patient Goals  Patient goal: "to get rid of this thigh pain "   Pain  Current pain ratin  Quality: dull ache  Relieving factors: heat  Exacerbated by: static posturing, lying down, sleeping   Progression: improved (" a little better since injeciton " )    Social Support  Lives with: alone    Working: retired - [employer]   Treatments  Current treatment: injection treatment        Objective     Concurrent Complaints  Positive for night pain and disturbed sleep. Negative for bladder dysfunction, bowel dysfunction and saddle (S4) numbness    Palpation   Left   No palpable tenderness to the lumbar paraspinals and quadratus lumborum. Right   No palpable tenderness to the lumbar paraspinals and quadratus lumborum. Tenderness     Lumbar Spine  No tenderness in the spinous process. Left Hip   No tenderness in the PSIS. Right Hip   Tenderness in the PSIS.      Neurological Testing     Sensation     Lumbar   Left   Intact: light touch    Right   Intact: light touch    Active Range of Motion     Lumbar   Flexion:  with pain Restriction level: moderate  Extension:  Restriction level: minimal  Left lateral flexion:  Restriction level: minimal  Right lateral flexion:  Restriction level: minimal    Joint Play   Joints within functional limits: L2, L3, L4, L5 and S1   Mechanical Assessment    Cervical      Thoracic      Lumbar    Standing flexion: repeated movements   Pain location:no change  Pain level: increased  Lying flexion: repeated movements  Pain location: no change  Pain level: increased  Standing extension: repeated movements  Pain location: no change  Lying extension: repeated movements  Pain intensity: better  Pain level: abolished  Left Sidegliding: repeated movements  Pain location: no change  Pain level: produced  Right sidegliding: repeated movements  Pain location: no change  Pain level: produced    Strength/Myotome Testing     Left Hip   Planes of Motion   Flexion: 4+  Abduction: 4+  External rotation: 4+  Internal rotation: 4+    Right Hip   Planes of Motion   Flexion: 4  Abduction: 4  External rotation: 4+  Internal rotation: 4+    Left Knee   Flexion: 5  Extension: 5    Right Knee   Flexion: 5  Extension: 5    Left Ankle/Foot   Dorsiflexion: 5    Right Ankle/Foot   Dorsiflexion: 5    Tests     Lumbar     Left   Negative crossed SLR, femoral stretch, passive SLR and slump test.     Right   Negative crossed SLR, femoral stretch, passive SLR and slump test.     Right Hip   Negative JAYDEN and FADIR. Additional Tests Details  Right LE approx 1/2" shorter in supine - no change with supine - sit leg length assessment   MET - no change in leg length  (+) discomfort right iliac crest region produced. General Comments:      Lumbar Comments  Gait - slowed, non-antalgic,            Precautions: Low back pain     [email or web address]  Access Code: [email or web address]    POC expires Unit limit Auth Expiration date PT/OT + Visit Limit?   10/31/ 23 3 units na bomn                           Visit/Unit Tracking  AUTH Status:  Date 8/31              na Used 1               Remaining                        Manuals 8/31                                                                Neuro Re-Ed                          Prone gs 3s x 20                                                                              Ther Ex             treadmill             Prone lying 1 min            Prop on elbows 1 min Press ups  10x             Loc and sag 10 x            Stand back ext                           Stand hip ext, hs curls             Stand HR                                                                                            Ther Activity                                       Gait Training                                       Modalities             MHP R LS/ hip prone 10 min

## 2023-08-31 NOTE — PROGRESS NOTES
PT Evaluation     Today's date: 2023  Patient name: Joaquin Edgar  : 1960  MRN: [id number]  Referring provider: Gustavo Gottlieb PT  Dx:   Encounter Diagnosis     ICD-10-CM    1. Acute right-sided low back pain with right-sided sciatica  M54.41 Ambulatory referral to PT spine                     Assessment  Assessment details: Patient was provided a home exercise program and demonstrated an understanding of exercises. Patient was advised to stop performing home exercise program if symptoms increase or new complaints developed. Verbal understanding demonstrated regarding home exercise program instructions. Patient would benefit from skilled physical therapy services for prescribed exercises, manual interventions, neuromuscular re-education, education, and modalities as deemed appropriate to assist patient in achieving their maximum level of function. Patient presents with c/o Right hip iliac crest/ lateral joint region pain/ discomfort intermittently x 3 weeks. SHe presents thru the comprehensive spine program after going to ED 3 weeks ago due to pain and difficulty walking. Evaluation reveals:  Extension bias with ability to abolish her symptoms during session    She does present with loss of trunk mobility, (+) leg length discrepancy ( right short approx 1/2" ), mild Right hip weakness, decreased flexibility bilateral hips, limited knee flexion to 90 bilaterally due to TKA's. She has (+) tenderness right greater trochanteric region     Patient will be out of town x 2 week but will resume PT upon return. Impairments: abnormal gait, abnormal or restricted ROM, activity intolerance, impaired physical strength, lacks appropriate home exercise program and pain with function  Understanding of Dx/Px/POC: good  Goals  STG   1. Patient will demonstrate independence and competence with HEP 2 -4 weeks  2. Patient will report > 25-50% reduced pain 2-4 weeks    LTG   1.   Patient will report improvements with both functional and recreational abilities  4-6 weeks  2. Patient will demonstrate improved motor function   4-6 weeks. 3.  Restoration of full / painfree trunk mobility  4-8 weeks  4. Right hip pain will be abolished per patient account  4-8 weeks. Plan  Plan details: Patient response to treatment will be monitored each session and progressed accordingly    Thank you for this referral.   Patient would benefit from: skilled physical therapy  Planned modality interventions: thermotherapy: hydrocollator packs  Planned therapy interventions: IADL retraining, joint mobilization, manual therapy, patient education, postural training, strengthening, stretching, therapeutic exercise, flexibility, home exercise program and neuromuscular re-education  Frequency: 2x week  Plan of Care expiration date: 10/31/2023  Treatment plan discussed with: patient        Subjective Evaluation    History of Present Illness  Mechanism of injury: Patient presents with c/o Right LS pain with right LE radicular pain - lateral thigh. She woke with severe pain    Went to the ED  Given pain medications   Went to [facility]  Given cortizone injection into lateral thigh - "helped somewhat"  Referred to Orthopedic chiropractor which she has been attending regularly. Now - her pain is located right lateral thigh - denies back pain   During the daytime - as she is moving, she does better   Nighttime pain (+) with sleep disruption   Painful right sidelying.      PMH R tka oct 2020 required manipulation  L tka  required manipulation   Patient Goals  Patient goal: "to get rid of this thigh pain "   Pain  Current pain ratin  Quality: dull ache  Relieving factors: heat  Exacerbated by: static posturing, lying down, sleeping   Progression: improved (" a little better since injeciton " )    Social Support  Lives with: alone    Working: retired - [employer]   Treatments  Current treatment: injection treatment        Objective     Concurrent Complaints  Positive for night pain and disturbed sleep. Negative for bladder dysfunction, bowel dysfunction and saddle (S4) numbness    Palpation   Left   No palpable tenderness to the lumbar paraspinals and quadratus lumborum. Right   No palpable tenderness to the lumbar paraspinals and quadratus lumborum. Tenderness     Lumbar Spine  No tenderness in the spinous process. Left Hip   No tenderness in the PSIS. Right Hip   Tenderness in the PSIS.      Neurological Testing     Sensation     Lumbar   Left   Intact: light touch    Right   Intact: light touch    Active Range of Motion     Lumbar   Flexion:  with pain Restriction level: moderate  Extension:  Restriction level: minimal  Left lateral flexion:  Restriction level: minimal  Right lateral flexion:  Restriction level: minimal    Joint Play   Joints within functional limits: L2, L3, L4, L5 and S1   Mechanical Assessment    Cervical      Thoracic      Lumbar    Standing flexion: repeated movements   Pain location:no change  Pain level: increased  Lying flexion: repeated movements  Pain location: no change  Pain level: increased  Standing extension: repeated movements  Pain location: no change  Lying extension: repeated movements  Pain intensity: better  Pain level: abolished  Left Sidegliding: repeated movements  Pain location: no change  Pain level: produced  Right sidegliding: repeated movements  Pain location: no change  Pain level: produced    Strength/Myotome Testing     Left Hip   Planes of Motion   Flexion: 4+  Abduction: 4+  External rotation: 4+  Internal rotation: 4+    Right Hip   Planes of Motion   Flexion: 4  Abduction: 4  External rotation: 4+  Internal rotation: 4+    Left Knee   Flexion: 5  Extension: 5    Right Knee   Flexion: 5  Extension: 5    Left Ankle/Foot   Dorsiflexion: 5    Right Ankle/Foot   Dorsiflexion: 5    Tests     Lumbar     Left   Negative crossed SLR, femoral stretch, passive SLR and slump test.     Right   Negative crossed SLR, femoral stretch, passive SLR and slump test.     Right Hip   Negative JAYDEN and FADIR. Additional Tests Details  Right LE approx 1/2" shorter in supine - no change with supine - sit leg length assessment   MET - no change in leg length  (+) discomfort right iliac crest region produced. General Comments:      Lumbar Comments  Gait - slowed, non-antalgic,             Precautions: Low back pain     [email or web address]  Access Code: [email or web address]    POC expires Unit limit Auth Expiration date PT/OT + Visit Limit?   10/31/ 23 3 units na bomn                           Visit/Unit Tracking  AUTH Status:  Date 8/31              na Used 1               Remaining                        Manuals 8/31                                                                Neuro Re-Ed                          Prone gs 3s x 20                                                                              Ther Ex             treadmill             Prone lying 1 min            Prop on elbows 1 min            Press ups  10x             Loc and sag 10 x            Stand back ext                           Stand hip ext, hs curls             Stand HR                                                                                            Ther Activity                                       Gait Training                                       Modalities             MHP R LS/ hip prone 10 min

## 2023-09-20 ENCOUNTER — OFFICE VISIT (OUTPATIENT)
Dept: PHYSICAL THERAPY | Facility: CLINIC | Age: 63
End: 2023-09-20
Payer: COMMERCIAL

## 2023-09-20 DIAGNOSIS — M54.41 ACUTE RIGHT-SIDED LOW BACK PAIN WITH RIGHT-SIDED SCIATICA: Primary | ICD-10-CM

## 2023-09-20 PROCEDURE — 97112 NEUROMUSCULAR REEDUCATION: CPT | Performed by: PHYSICAL THERAPIST

## 2023-09-20 PROCEDURE — 97110 THERAPEUTIC EXERCISES: CPT | Performed by: PHYSICAL THERAPIST

## 2023-09-20 NOTE — PROGRESS NOTES
Daily Note     Today's date: 2023  Patient name: Radha Rodrigues  : 1960  MRN: [id number]  Referring provider: Devon Mathews, PT  Dx:   Encounter Diagnosis     ICD-10-CM    1. Acute right-sided low back pain with right-sided sciatica  M54.41                      Subjective: Patient returns after hiatus due to being out of town. 0/10 back  R lateral knee joint line up to 10/10  - worst during the nighttime with (+) sleep disruption   Patient reports that her lateral knee feels much better after IASTM today     Objective: See treatment diary below      Assessment: Tolerated treatment well. Patient exhibited good technique with therapeutic exercises and would benefit from continued PT  Good initial response to IASTM -  Monitor longer term response and repeat as indicated     Plan: Continue per plan of care. Progress treatment as tolerated. Precautions: Low back pain     [email or web address]  Access Code: [email or web address]    POC expires Unit limit Auth Expiration date PT/OT + Visit Limit?   10/31/ 23 3 units na bomn                           Visit/Unit Tracking  AUTH Status:  Date              na Used 1 2              Remaining                        Manuals                         IASTIM right distal ITband  Db            Manual hs/ itband stretch R   db                        Neuro Re-Ed                          Prone gs 3s x 20                                                                              Ther Ex             treadmill  5 min self paced           Prone lying 1 min            Prop on elbows 1 min            Press ups  10x             Loc and sag 10 x            Stand back ext                           Stand hip ext, hs curls  20x ea           Stand HR   20x                         qs  3s x 20            hss w/ strap  20s x 5            ITband w/ strap supine  20s x 3                                     Ther Activity                                       Gait Training                                       Modalities             MHP R LS/ hip prone 10 min            Ice right lateral knee  10 min

## 2023-09-22 ENCOUNTER — OFFICE VISIT (OUTPATIENT)
Dept: PHYSICAL THERAPY | Facility: CLINIC | Age: 63
End: 2023-09-22
Payer: COMMERCIAL

## 2023-09-22 DIAGNOSIS — M54.41 ACUTE RIGHT-SIDED LOW BACK PAIN WITH RIGHT-SIDED SCIATICA: Primary | ICD-10-CM

## 2023-09-22 PROCEDURE — 97110 THERAPEUTIC EXERCISES: CPT

## 2023-09-22 PROCEDURE — 97112 NEUROMUSCULAR REEDUCATION: CPT

## 2023-09-22 NOTE — PROGRESS NOTES
Daily Note     Today's date: 2023  Patient name: Jeremy Adhikari  : 1960  MRN: [id number]  Referring provider: Ambrose Candelario, PT  Dx: No diagnosis found. Subjective: Patient denied calf, knee or lumbar pain. Patient noted significant decline in subjective pain after last visit. Patient stated she was able to sleep through the night without pain. Objective: See treatment diary below      Assessment: Tolerated treatment with good response to current program with good pain management and improved ability to perform functional tasks. Improved FOTO score today. Note posterior/lateral (+) swelling of right knee. Patient exhibited good technique with therapeutic exercises and would benefit from continued PT      Plan: Continue per plan of care. Progress treatment as tolerated. Precautions: Low back pain     [email or web address]  Access Code: [email or web address]    POC expires Unit limit Auth Expiration date PT/OT + Visit Limit?   10/31/ 23 3 units na bomn                           Visit/Unit Tracking  AUTH Status:  Date             na Used 1 2 3             Remaining    FOTO                    Manuals                        IASTIM right distal ITband  Db  LA          Manual hs/ itband stretch R   db LA                       Neuro Re-Ed                          Prone gs 3s x 20                                                                              Ther Ex             treadmill  5 min self paced 5' self paced          Prone lying 1 min            Prop on elbows 1 min            Press ups  10x             Loc and sag 10 x            Stand back ext                           Stand hip ext, hs curls  20x ea 20x each          Stand HR   20x  20x                       qs  3s x 20  :03  20x          hss w/ strap  20s x 5  :20  5x          ITband w/ strap supine  20s x 3 :20  5x                                    Ther Activity Gait Training                                       Modalities             MHP R LS/ hip prone 10 min            Ice right lateral knee  10 min Pt def

## 2023-09-27 ENCOUNTER — OFFICE VISIT (OUTPATIENT)
Dept: PHYSICAL THERAPY | Facility: CLINIC | Age: 63
End: 2023-09-27
Payer: COMMERCIAL

## 2023-09-27 DIAGNOSIS — M54.41 ACUTE RIGHT-SIDED LOW BACK PAIN WITH RIGHT-SIDED SCIATICA: Primary | ICD-10-CM

## 2023-09-27 PROCEDURE — 97110 THERAPEUTIC EXERCISES: CPT

## 2023-09-27 PROCEDURE — 97112 NEUROMUSCULAR REEDUCATION: CPT

## 2023-09-27 NOTE — PROGRESS NOTES
Daily Note     Today's date: 2023  Patient name: Heath Sicard  : 1960  MRN: [id number]  Referring provider: Kan Zavala, PT  Dx: No diagnosis found. Subjective: Patient presented with right hip discomfort today. Patient reported less calf pain since initiating therapy services. Patient noted she is very pleased with progress to date. Objective: See treatment diary below      Assessment: Tolerated treatment responding well to current program with decline in subjective complaints and reported improved functional mobility. Patient exhibited good technique with therapeutic exercises and would benefit from continued PT      Plan: Continue per plan of care. Progress treatment as tolerated. Precautions: Low back pain     [email or web address]  Access Code: [email or web address]    POC expires Unit limit Auth Expiration date PT/OT + Visit Limit?   10/31/ 23 3 units na bomn                           Visit/Unit Tracking  AUTH Status:  Date            na Used 1 2 3 4            Remaining    FOTO                    Manuals                       IASTIM right distal ITband  Db  LA LA         Manual hs/ itband stretch R   db LA LA         Zak Stretch    LA         Neuro Re-Ed                          Prone gs 3s x 20                                                                              Ther Ex             treadmill  5 min self paced 5' self paced 5' self paced         Prone lying 1 min            Prop on elbows 1 min            Press ups  10x             Loc and sag 10 x            Stand back ext                           Stand hip ext, hs curls  20x ea 20x each B  1.5#  20x each         Stand HR   20x  20x 20x #                     qs  3s x 20  :03  20x :03  20x         hss w/ strap  20s x 5  :20  5x :20  5x         ITband w/ strap supine  20s x 3 :20  5x :20  5x                                   Ther Activity Gait Training                                       Modalities             MHP R LS/ hip prone 10 min            Ice right lateral knee  10 min Pt def Right hip  10'

## 2023-09-29 ENCOUNTER — OFFICE VISIT (OUTPATIENT)
Dept: PHYSICAL THERAPY | Facility: CLINIC | Age: 63
End: 2023-09-29
Payer: COMMERCIAL

## 2023-09-29 DIAGNOSIS — M54.41 ACUTE RIGHT-SIDED LOW BACK PAIN WITH RIGHT-SIDED SCIATICA: Primary | ICD-10-CM

## 2023-09-29 PROCEDURE — 97110 THERAPEUTIC EXERCISES: CPT | Performed by: PHYSICAL THERAPIST

## 2023-09-29 PROCEDURE — 97112 NEUROMUSCULAR REEDUCATION: CPT | Performed by: PHYSICAL THERAPIST

## 2023-09-29 NOTE — PROGRESS NOTES
Daily Note     Today's date: 2023  Patient name: Mohinder Gutiérrez  : 1960  MRN: [id number]  Referring provider: Virginie Estes PT  Dx:   Encounter Diagnosis     ICD-10-CM    1. Acute right-sided low back pain with right-sided sciatica  M54.41                      Subjective: patient without lateral right knee pain   Discomfort right anterior hip pain / discomfort is "deeper "    Feels better post session  " much looser "       Objective: See treatment diary below      Assessment: Tolerated treatment well overall   Side glides R f/b standing back ext reduced pain     Encouraged to perform repeated sg/ back ext consistently over the weekend. Plan: Continue per plan of care. Progress treatment as tolerated. Precautions: Low back pain     [email or web address]  Access Code: [email or web address]    POC expires Unit limit Auth Expiration date PT/OT + Visit Limit?   10/31/ 23 3 units na bomn                           Visit/Unit Tracking  AUTH Status:  Date           na Used 1 2 3 4 5           Remaining    FOTO                    Manuals                      IASTIM right distal ITband  Db  LA LA dc        Manual hs/ itband stretch R   db LA LA Quad / knee flexion stretch R db        Zak Stretch    LA         Neuro Re-Ed                          Prone gs 3s x 20     3s x 20                                                                          Ther Ex             treadmill  5 min self paced 5' self paced 5' self paced 10 min         Prone lying 1 min            Prop on elbows 1 min            Press ups  10x             Loc and sag 10 x            Stand back ext                           Stand hip ext, hs curls  20x ea 20x each B  1.5#  20x each 2# x 20 ea         Stand HR   20x  20x 20x #2 x 20                      qs  3s x 20  :03  20x :03  20x 3s x 20         hss w/ strap  20s x 5  :20  5x :20  5x 20s x 3 bilat         ITband w/ strap supine  20s x 3 :20  5x :20  5x 20s x 3 bilat         Hip flexor stretch off table     20s x 3 bilat        Prone quad stretch      20s x 3 bilat                     R SG in stand     5 x 5        Stand back ext     5 x 5                                   Ther Activity                                       Gait Training                                       Modalities             MHP R LS/ hip prone 10 min            Ice right lateral knee  10 min Pt def Right hip  10' R hip x 10'

## 2023-10-04 ENCOUNTER — OFFICE VISIT (OUTPATIENT)
Dept: PHYSICAL THERAPY | Facility: CLINIC | Age: 63
End: 2023-10-04
Payer: COMMERCIAL

## 2023-10-04 DIAGNOSIS — M54.41 ACUTE RIGHT-SIDED LOW BACK PAIN WITH RIGHT-SIDED SCIATICA: Primary | ICD-10-CM

## 2023-10-04 PROCEDURE — 97110 THERAPEUTIC EXERCISES: CPT | Performed by: PHYSICAL THERAPIST

## 2023-10-04 PROCEDURE — 97112 NEUROMUSCULAR REEDUCATION: CPT | Performed by: PHYSICAL THERAPIST

## 2023-10-04 NOTE — PROGRESS NOTES
DISCHARGE /   Daily Note     Today's date: 10/4/2023  Patient name: Mckinley Ambrocio  : 1960  MRN: [id number]  Referring provider: Alyse Quinn, PT  Dx:   Encounter Diagnosis     ICD-10-CM    1. Acute right-sided low back pain with right-sided sciatica  M54.41                      Subjective: patient without lateral right knee pain   Discomfort right anterior hip pain / discomfort is "deeper "    Feels better post session  " much looser "       Objective: See treatment diary below    Goals  STG   1. Patient will demonstrate independence and competence with HEP 2 -4 weeks  MET  2. Patient will report > 25-50% reduced pain 2-4 weeks  MET    LTG   1. Patient will report improvements with both functional and recreational abilities  4-6 weeks  MET  2. Patient will demonstrate improved motor function   4-6 weeks. MET  3. Restoration of full / painfree trunk mobility  4-8 weeks  MET  4. Right hip pain will be abolished per patient account  4-8 weeks. Partially MET        Assessment: nice progress towards original goals achieved. Plan: Discharge skilled PT at this time as patient informs us that she will be out of town for a couple months. She is competent with HEP and notes that she will continue with such after departure today. Precautions: Low back pain     [email or web address]  Access Code: [email or web address]    POC expires Unit limit Auth Expiration date PT/OT + Visit Limit?   10/31/ 23 3 units na bomn                           Visit/Unit Tracking  AUTH Status:  Date  10         na Used 1 2 3 4 5 6          Remaining    FOTO                    Manuals  10/4                    IASTIM right distal ITband  Db  LA LA dc        Manual hs/ itband stretch R   db LA LA Quad / knee flexion stretch R db dc       Zak Stretch    LA         Neuro Re-Ed                          Prone gs 3s x 20     3s x 20 Ther Ex             treadmill  5 min self paced 5' self paced 5' self paced 10 min  10 min       Prone lying 1 min            Prop on elbows 1 min            Press ups  10x             Loc and sag 10 x            Stand back ext                           Stand hip ext, hs curls  20x ea 20x each B  1.5#  20x each 2# x 20 ea  2# x 20 ea       Stand HR   20x  20x 20x #2 x 20  2# x 20                     qs  3s x 20  :03  20x :03  20x 3s x 20  3s x 20        hss w/ strap  20s x 5  :20  5x :20  5x 20s x 3 bilat  20s x 3        ITband w/ strap supine  20s x 3 :20  5x :20  5x 20s x 3 bilat  20s x 3 bilat       Hip flexor stretch off table     20s x 3 bilat 20s x 3        Prone quad stretch      20s x 3 bilat 20s x 3 bilat                    R SG in stand     5 x 5 5 x 5       Stand back ext     5 x 5  5 x 5                                 Ther Activity                                       Gait Training                                       Modalities             MHP R LS/ hip prone 10 min            Ice right lateral knee  10 min Pt def Right hip  10' R hip x 10'

## 2023-10-06 ENCOUNTER — APPOINTMENT (OUTPATIENT)
Dept: PHYSICAL THERAPY | Facility: CLINIC | Age: 63
End: 2023-10-06
Payer: COMMERCIAL

## 2023-10-11 ENCOUNTER — APPOINTMENT (OUTPATIENT)
Dept: PHYSICAL THERAPY | Facility: CLINIC | Age: 63
End: 2023-10-11
Payer: COMMERCIAL

## 2023-10-13 ENCOUNTER — APPOINTMENT (OUTPATIENT)
Dept: PHYSICAL THERAPY | Facility: CLINIC | Age: 63
End: 2023-10-13
Payer: COMMERCIAL

## 2024-01-18 ENCOUNTER — OFFICE VISIT (OUTPATIENT)
Dept: OBGYN CLINIC | Facility: CLINIC | Age: 64
End: 2024-01-18
Payer: COMMERCIAL

## 2024-01-18 VITALS
DIASTOLIC BLOOD PRESSURE: 78 MMHG | BODY MASS INDEX: 29.37 KG/M2 | WEIGHT: 172 LBS | SYSTOLIC BLOOD PRESSURE: 136 MMHG | HEIGHT: 64 IN

## 2024-01-18 DIAGNOSIS — M54.16 LUMBAR RADICULOPATHY: Primary | ICD-10-CM

## 2024-01-18 PROCEDURE — 99203 OFFICE O/P NEW LOW 30 MIN: CPT | Performed by: FAMILY MEDICINE

## 2024-01-18 NOTE — PROGRESS NOTES
Assessment:     1. Lumbar radiculopathy  Ambulatory Referral to [facility]        Orders Placed This Encounter   Procedures    Ambulatory Referral to [facility]        Impression:   Right leg pain likely secondary to lumbar degenerative changes with radicular symptoms..      Conservative Management   We discussed different treatment options:  Reviewed orthopedic documentation completed on 12/05/2023.    Patient states she may have trialed Medrol Dosepak in November 2023 without significant improvement  Ice or Heat Therapy as needed 1-2 times daily for 10-20 minutes. As tolerated.   Over the counter Tylenol and/or NSAIDs  as needed based off your Past Medical Hx. Please follow product label for dosing and maximum limits.    Trial of over the counter Topical Analgesics such as Lidocaine cream or Voltaren Gel, as tolerated. If skin becomes irritated, discontinue use.   Formal Handout provided on General Information of core exercises  Initiate Formal Physical Therapy at any preferred location.  Prescription provided.        Imaging   Reviewed prior xrays obtain.  Unable to reviewed in office with patient today, as they were completed outside [facility].  X-rays completed lumbar spine:   Radiological impression:   1. Lumbar spondylosis L3-S1 with evidence of facet arthropathy  2. Possible subtle grade 1 spondylolisthesis L3-4    Procedure  Not appropriate at this time.     Shared decision making, patient agreeable to plan.      Return for Follow up after 3 months.    HPI:   Halie Garber is a 63 y.o. female  who presents for evaluation of   Chief Complaint   Patient presents with    Right Knee - Pain       Occupation: Retired  Injury Related: No     Onset/Mechanism: Discomfort started in August.   Location: Numbness and tingling in the radiculopathic of L5.  Severity: Current severity: 2/10.   Pain described as: Numbness and tingling down lateral anterior thigh past the knee into medial  calf  Radiation: Radiates down leg.  Associated symptoms: Numbness and tingling.    HX of surgery of affected limb. Completed: 4 years ago.   Surgery was completed in [place].  Denies saddle      Summary of treatment to-date:   Formal physical therapy for IT band  Medrol Dosepak    Following History Reviewed and Updated     Past Medical History:   Diagnosis Date    Chronic kidney disease      Past Surgical History:   Procedure Laterality Date    JOINT REPLACEMENT Bilateral     knee    KNEE SURGERY Bilateral      Family History   Problem Relation Age of Onset    No Known Problems Mother     Dementia Father     Alzheimer's disease Father     Leukemia Father        Social History     Substance and Sexual Activity   Alcohol Use Yes    Alcohol/week: 2.0 standard drinks of alcohol    Types: 2 Glasses of wine per week     Social History     Substance and Sexual Activity   Drug Use Never     Social History     Tobacco Use   Smoking Status Former    Current packs/day: 0.00    Types: Cigarettes    Quit date: 2021    Years since quittin.5   Smokeless Tobacco Never       Social Determinants of Health     Tobacco Use: Medium Risk (2024)    Patient History     Smoking Tobacco Use: Former     Smokeless Tobacco Use: Never     Passive Exposure: Not on file   Alcohol Use: Not on file   Financial Resource Strain: Not on file   Food Insecurity: Not on file   Transportation Needs: Not on file   Physical Activity: Not on file   Stress: Not on file   Social Connections: Not on file   Intimate Partner Violence: Not on file   Depression: Not at risk (10/27/2023)    Received from [facility]    PHQ-2     Patient Health Questionnaire-2 Score: 0   Housing Stability: Not on file   Utilities: Not on file   Health Literacy: Not on file        Allergies   Allergen Reactions    Penicillins Rash       Review of Systems      Review of Systems   Review of Systems   Constitutional: Negative for chills and fever.   HENT: Negative for  "drooling and sneezing.    Eyes: Negative for redness.   Respiratory: Negative for cough and wheezing.    Gastrointestinal: Negative for vomiting.   Psychiatric/Behavioral: Negative for behavioral problems. The patient is not nervous/anxious.      All other systems negative.   Physical Exam   Physical Exam    Vitals and nursing note reviewed.  Constitutional:   Appearance. Normal Appearance.  /78   Ht 5' 4\" (1.626 m)   Wt 78 kg (172 lb)   BMI 29.52 kg/m²     Body mass index is 29.52 kg/m².   HENT:  Head: Atraumatic.  Nose: Nose normal  Eyes: Conjunctiva/sclera: Conjunctivae normal.  Cardiovascular:   Rate and Rhythm: Bilateral equal distal pulses  Pulmonary:   Effort: Pulmonary effort is normal  Skin:   General: Skin is warm and dry.  Neurological:   General: No focal deficit present.  Mental Status: Alert and oriented to person, place, and time.   Psychiatric:   Mood and Affect: mood normal.  Behavior: Behavior normal     Musculoskeletal Exam     Ortho Exam     Right HIP and BACK     Inspection:  Gait Gross deformity Warmth   Normal Negative Negative     TENDERNESS:  Thoracic/Lumbar Spinous Processes Paraspinal Muscles SI Joint: Sacrum REINA Greater Trochanteric Bursa   Negative Negative Negative N/A Negative     HIP Range of Motion:  Hip Supine Supine Prone Prone   Flexion ER IR ER IR   Intact and symmetrical  Intact and symmetrical  Intact and symmetrical  N/A N/A     DERMATOMAL SENSATION:  L1 L2 L3 L4 L5 S1   Intact Intact Intact Intact Intact Intact     STRENGTH (bilateral):  Hip flexion Knee Flexion Knee extension Foot dorsiflexion Great toe extension Foot plantarflexion   5/5 5/5 5/5 5/5 5/5 5/5     SPECIAL TESTS:   B/L Hip  Logroll JAYDEN FADIR Jcarlos's Popliteal angle Supine straight leg Prone straight leg   N/A Negative Negative N/A N/A Positive reproducing radicular symptoms on right leg N/A     SI JOINT ASIS COMPRESSION TEST:  STORK TEST:  ALEJANDRA'S FINGER: JAYDEN SI PAIN:   Negative  N/A N/A Negative  " "Negative      Neurovascular:  Sensation to light touch Posterior tibial artery   Intact and equal bilaterally Intact and equal bilaterally              Procedures       Portions of the record may have been created with voice recognition software. Occasional wrong word or \"sound alike\" substitutions may have occurred due to the inherent limitations of voice recognition software. Please review the chart carefully and recognize, using context, where substitutions/typographical errors may have occurred.   "

## 2024-02-01 ENCOUNTER — EVALUATION (OUTPATIENT)
Dept: PHYSICAL THERAPY | Facility: CLINIC | Age: 64
End: 2024-02-01
Payer: COMMERCIAL

## 2024-02-01 DIAGNOSIS — M54.16 LUMBAR RADICULOPATHY: ICD-10-CM

## 2024-02-01 PROCEDURE — 97110 THERAPEUTIC EXERCISES: CPT | Performed by: PHYSICAL THERAPIST

## 2024-02-01 PROCEDURE — 97162 PT EVAL MOD COMPLEX 30 MIN: CPT | Performed by: PHYSICAL THERAPIST

## 2024-02-01 NOTE — PROGRESS NOTES
PT Evaluation     Today's date: 2024  Patient name: Halie Garber  : 1960  MRN: [id number]  Referring provider: Maite Asif*  Dx:   Encounter Diagnosis     ICD-10-CM    1. Lumbar radiculopathy  M54.16 Ambulatory Referral to [facility]                     Assessment  Assessment details: Pt is a 64 y/o female who presents to physical therapy with primary peripheral neuropathic pain associated with Lower lumbar radiculopathy in the environment of reduced lower lumbar mobility complicated by BMI and chronicity. Pt does not present with any red flag symptoms at this time. Signs and symptoms consistent with this include dysesthesia in an anatomically correct region, reduced lumbar ROM with recreation of LE symptoms, and decreased lower lumbar mobility. Trial treatment of prone press-ups and joint mobilization resolved hip pain with forward bending. Education provided regarding POC, prognosis and HEP, pt verablized understanding. Pt would benefit from skilled physical therapy in order to decrease deficits and return to prior level of function.    Impairments: abnormal gait, abnormal or restricted ROM, activity intolerance, impaired physical strength and pain with function  Understanding of Dx/Px/POC: good  Goals  STG( 4 weeks):  Pt will be independent with basic HEP.  Pt will demonstrate increase in lumbar flexion ROM to WNL without symptom reproduction.      LTG (8 weeks):  FOTO will be expected outcome.   Pt will demonstrate non-painful lumbar R SB.   Pt will report ability to tolerate sitting for 1 hour without increase in symptoms during STS transition.   Pt will report ability to walk dogs without any increase in low back symptoms the next day.       Plan  Patient would benefit from: skilled physical therapy  Planned modality interventions: cryotherapy  Planned therapy interventions: manual therapy, neuromuscular re-education, patient education, self care, strengthening,  stretching, therapeutic activities, therapeutic exercise and home exercise program  Frequency: 1-2x/week.  Duration in weeks: 8  Treatment plan discussed with: patient    Subjective Evaluation    History of Present Illness  Mechanism of injury: Chief Complaint: Pt was seen in this facility in August to October for R sided low back pain and R LE symptoms. She reports that she woke up with it one day insidiously. She states that it began as R calf pain. She had seen PT and the calf pain resolved, but she continued to experience R lateral thigh/hip pain and a return of numbness/tingling in the R lateral calf and foot began. She reports going to chiro without improvement. She also experiences low back pain when lifting too heavy or trying to walk big dogs that pull her.     Severity: moderate  Irritability: moderate  Nature: peripheral neuropathic  Stage: chronic  Stability: improving    P1: see body chart    Physical Activity: Continues with old HEP- helping with R lateral thigh pain  Patient Goals  Patient goal: lift without back pain, be able to walk dogs without pain      Objective     Postural Observations    Additional Postural Observation Details  R LE longer than L  Compensation via R SB in standing  Reduced lower lumbar extension    Neurological Testing     Additional Neurological Details  Will assess at next visit    Active Range of Motion     Additional Active Range of Motion Details  FB: 100% increase in R lateral hip pain at end range, resolves when returning, no large deviation noted  BB: 50% with hinge in upper lumbar spine  R SB: 50% with increased R lateral hip pain  L SB: 100% no pain  R lateral shift correct: peripheralization  L lateral shift correct: peripheralization      Joint Play   L5 comments: Pain with R UPA at L5           Precautions: CKD    POC expires Unit limit Auth Expiration date PT/OT/ST + Visit Limit?   3/28/24 4 N/a 25 combo                           Visit/Unit Tracking  AUTH Status:   Date 2/1               Used 1               Remaining                          Assess neuro screen, neurodynamics           Manuals 2/1            R UPA L5 Gd 2-3 ANGELI 5'                                                   Neuro Re-Ed             sliders                                                                                           Ther Ex             PPU 10x            PPU progression                                                                              Pt edu ANGELI            Ther Activity                                       Gait Training                                       Modalities

## 2024-02-09 ENCOUNTER — OFFICE VISIT (OUTPATIENT)
Dept: PHYSICAL THERAPY | Facility: CLINIC | Age: 64
End: 2024-02-09
Payer: COMMERCIAL

## 2024-02-09 DIAGNOSIS — M54.16 LUMBAR RADICULOPATHY: Primary | ICD-10-CM

## 2024-02-09 PROCEDURE — 97112 NEUROMUSCULAR REEDUCATION: CPT | Performed by: PHYSICAL THERAPIST

## 2024-02-09 PROCEDURE — 97140 MANUAL THERAPY 1/> REGIONS: CPT | Performed by: PHYSICAL THERAPIST

## 2024-02-09 NOTE — PROGRESS NOTES
Daily Note     Today's date: 2024  Patient name: Halie Garber  : 1960  MRN: [id number]  Referring provider: Maite Asif*  Dx:   Encounter Diagnosis     ICD-10-CM    1. Lumbar radiculopathy  M54.16                      Subjective: Pt reports that her back symptoms are improving and now it is just really when she lifts. She also reports her leg symptoms have been fairly low.       Objective: See treatment diary below      Assessment: Tolerated treatment well. ROM significantly improved with BB following MT and PPU. Poor TA control today, largely improved with cueing. Difficulty completing with breathing bu improved with practice. Updated HEP as pt moving into movement control phase. Patient would benefit from continued PT      Plan: Continue per plan of care.  Progress treatment as tolerated.       Precautions: CKD    POC expires Unit limit Auth Expiration date PT/OT/ST + Visit Limit?   3/28/24 4 N/a 25 combo                           Visit/Unit Tracking  AUTH Status:  Date                Used 1               Remaining                          Assess neuro screen, neurodynamics           Manuals            R UPA L5 Gd 2-3 ANGELI 5' Gd 3-4 ANGELI 10'           L sidelying lumbar HVLA  ANGELI                                     Neuro Re-Ed             TA activation  ANGELI 10'           TA SLR  ANGELI 1x10/side                                                                            Ther Ex             PPU 10x 2x10; 2x10                                                                                         Pt edu ANGELI            Ther Activity                                       Gait Training                                       Modalities

## 2024-02-16 ENCOUNTER — OFFICE VISIT (OUTPATIENT)
Dept: PHYSICAL THERAPY | Facility: CLINIC | Age: 64
End: 2024-02-16
Payer: COMMERCIAL

## 2024-02-16 DIAGNOSIS — M54.16 LUMBAR RADICULOPATHY: Primary | ICD-10-CM

## 2024-02-16 PROCEDURE — 97112 NEUROMUSCULAR REEDUCATION: CPT | Performed by: PHYSICAL THERAPIST

## 2024-02-16 PROCEDURE — 97140 MANUAL THERAPY 1/> REGIONS: CPT | Performed by: PHYSICAL THERAPIST

## 2024-02-16 NOTE — PROGRESS NOTES
Daily Note     Today's date: 2024  Patient name: Halie Garber  : 1960  MRN: [id number]  Referring provider: Maite Asif*  Dx:   Encounter Diagnosis     ICD-10-CM    1. Lumbar radiculopathy  M54.16                      Subjective: Pt reports she was feeling good following last session. She states that she was doing well up until she had to shovel on Tuesday.       Objective: See treatment diary below      Assessment: Tolerated treatment well. Following MT, reduced hip pain with R SB and flex. No calf pain. Improving lumbopelvic control today but continues to have difficulty with breathing during. Slight L sided low back pain with superman that resolved when done. Patient would benefit from continued PT      Plan: Continue per plan of care.  Progress treatment as tolerated.       Precautions: CKD    POC expires Unit limit Auth Expiration date PT/OT/ST + Visit Limit?   3/28/24 4 N/a 25 combo                           Visit/Unit Tracking  AUTH Status:  Date                Used 1               Remaining                          Assess neuro screen, neurodynamics           Manuals           R UPA L5 Gd 2-3 ANGELI 5' Gd 3-4 ANGELI 10' Gd 3-4 ANGELI 8'          L sidelying lumbar HVLA  ANGELI ANGELI                                    Neuro Re-Ed             TA activation  ANGELI 10'           TA SLR  ANGELI 1x10/side ANGELI 2x10 /side          TA PB roll with strap   1x10          Prone Superman   2x8                                                 Ther Ex             PPU 10x 2x10; 2x10 2x10                                                                                        Pt edu ANGELI            Ther Activity                                       Gait Training                                       Modalities

## 2024-02-23 ENCOUNTER — OFFICE VISIT (OUTPATIENT)
Dept: PHYSICAL THERAPY | Facility: CLINIC | Age: 64
End: 2024-02-23
Payer: COMMERCIAL

## 2024-02-23 DIAGNOSIS — M54.16 LUMBAR RADICULOPATHY: Primary | ICD-10-CM

## 2024-02-23 PROCEDURE — 97140 MANUAL THERAPY 1/> REGIONS: CPT | Performed by: PHYSICAL THERAPIST

## 2024-02-23 PROCEDURE — 97110 THERAPEUTIC EXERCISES: CPT | Performed by: PHYSICAL THERAPIST

## 2024-02-23 NOTE — PROGRESS NOTES
Daily Note     Today's date: 2024  Patient name: Halie Garber  : 1960  MRN: [id number]  Referring provider: Maite Asif*  Dx:   Encounter Diagnosis     ICD-10-CM    1. Lumbar radiculopathy  M54.16                      Subjective: Pt reports that her calf tingling returned on Wednesday this week. She continues to have it and woke up with it this morning, but reports it has resolved by the time she arrived.       Objective: See treatment diary below      Assessment: Tolerated treatment well. Lateral shift repeated motions to the R improved R SB. Education on HEP for this and to not complete L side. Slump slider reduced (+) slump by end of treatment. Added to Hep. Education on potential mechanics that could be causing irritation for pt to keep an eye out throughout the day. Patient would benefit from continued PT      Plan: Continue per plan of care.  Progress treatment as tolerated.       Precautions: CKD    POC expires Unit limit Auth Expiration date PT/OT/ST + Visit Limit?   3/28/24 4 N/a 25 combo                           Visit/Unit Tracking  AUTH Status:  Date                Used 1               Remaining                          Assess neuro screen, neurodynamics           Manuals          R UPA L5 Gd 2-3 ANGELI 5' Gd 3-4 ANGELI 10' Gd 3-4 ANGELI 8'          L sidelying lumbar HVLA  ANGELI ANGELI          Reassessment     ANGELI- jt mobs, ROM, neurodynamics                      Neuro Re-Ed             TA activation  ANGELI 10'           TA SLR  ANGELI 1x10/side ANGELI 2x10 /side          TA PB roll with strap   1x10          Prone Superman   2x8          Slump slider    20x                                   Ther Ex             PPU 10x 2x10; 2x10 2x10          Lateral glide    L to R 3x10; R to L 2x10                                                                          Pt edu ANGELI   ANGELI         Ther Activity                                       Gait Training                                        Modalities

## 2024-03-01 ENCOUNTER — APPOINTMENT (OUTPATIENT)
Dept: PHYSICAL THERAPY | Facility: CLINIC | Age: 64
End: 2024-03-01
Payer: COMMERCIAL

## 2024-03-29 ENCOUNTER — OFFICE VISIT (OUTPATIENT)
Dept: PHYSICAL THERAPY | Facility: CLINIC | Age: 64
End: 2024-03-29
Payer: COMMERCIAL

## 2024-03-29 DIAGNOSIS — M54.16 LUMBAR RADICULOPATHY: Primary | ICD-10-CM

## 2024-03-29 PROCEDURE — 97110 THERAPEUTIC EXERCISES: CPT | Performed by: PHYSICAL THERAPIST

## 2024-03-29 PROCEDURE — 97112 NEUROMUSCULAR REEDUCATION: CPT | Performed by: PHYSICAL THERAPIST

## 2024-03-29 NOTE — PROGRESS NOTES
PT Re-Evaluation     Today's date: 3/29/2024  Patient name: Halie Garber  : 1960  MRN: [id number]  Referring provider: Maite Asif*  Dx:   Encounter Diagnosis     ICD-10-CM    1. Lumbar radiculopathy  M54.16                        Assessment  Assessment details: Pt is a 64 y/o female who presents to physical therapy with primary peripheral neuropathic pain associated with lower lumbar radiculopathy in the environment of reduced lower lumbar mobility complicated by BMI and chronicity. Pt reports she was doing well prior to yesterday having almost no pain in the R low back. However, she lifted a 30# bag of dog food and had an increase in L sided low back pain. Improved with flexion based exercise. Discussed strengthening program to reduce strain with lifting, pt verbalized understanding. Pt would benefit from skilled physical therapy in order to decrease deficits and return to prior level of function.    Impairments: abnormal gait, abnormal or restricted ROM, activity intolerance, impaired physical strength and pain with function  Understanding of Dx/Px/POC: good  Goals  STG( 4 weeks): - MET  Pt will be independent with basic HEP.  Pt will demonstrate increase in lumbar flexion ROM to WNL without symptom reproduction.      LTG (8 weeks): - ongoing  FOTO will be expected outcome.   Pt will demonstrate non-painful lumbar R SB.   Pt will report ability to tolerate sitting for 1 hour without increase in symptoms during STS transition.   Pt will report ability to walk dogs without any increase in low back symptoms the next day.       Plan  Patient would benefit from: skilled physical therapy  Planned modality interventions: cryotherapy  Planned therapy interventions: manual therapy, neuromuscular re-education, patient education, self care, strengthening, stretching, therapeutic activities, therapeutic exercise and home exercise program  Frequency: 1-2x/week.  Duration in weeks: 8  Treatment plan  discussed with: patient    Subjective Evaluation    History of Present Illness  Mechanism of injury: Chief Complaint: Pt was seen in this facility in August to October for R sided low back pain and R LE symptoms. She reports that she woke up with it one day insidiously. She states that it began as R calf pain. She had seen PT and the calf pain resolved, but she continued to experience R lateral thigh/hip pain and a return of numbness/tingling in the R lateral calf and foot began. She reports going to chiro without improvement. She also experiences low back pain when lifting too heavy or trying to walk big dogs that pull her.     Severity: moderate  Irritability: moderate  Nature: peripheral neuropathic  Stage: chronic  Stability: improving    P1: see body chart    Physical Activity: Continues with old HEP- helping with R lateral thigh pain  Patient Goals  Patient goal: lift without back pain, be able to walk dogs without pain      Objective     Postural Observations    Additional Postural Observation Details  R LE longer than L  Compensation via R SB in standing  Reduced lower lumbar extension    Active Range of Motion     Additional Active Range of Motion Details  FB: 100% increase in L lateral hip pain at end range, resolves when returning, no large deviation noted  BB: 25% with hinge in upper lumbar spine, large increase in L low back pain peripheralizes to the leg  R SB: 50% with increased L low back pain and peripheralization  L SB: 100% no pain  R lateral shift correct: peripheralization  L lateral shift correct: peripheralization      Joint Play   L5 comments: Pain with R UPA at L5  Mechanical Assessment    Cervical      Thoracic      Lumbar    Lying flexion: repeated movements  Pain intensity: better  Pain level: abolished  L SKTC  Left Sidegliding: repeated movements  Pain location: peripheralized  Pain intensity: worse  Pain level: increased  Right sidegliding: repeated movements  Pain location:  "peripheralized  Pain intensity:worse  Pain level: increased             Precautions: CKD    POC expires Unit limit Auth Expiration date PT/OT/ST + Visit Limit?   3/28/24 4 N/a 25 combo                           Visit/Unit Tracking  AUTH Status:  Date 2/1               Used 1               Remaining                      Assess neuro screen, neurodynamics           Manuals 2/1 2/9 2/16 2/23 3/29        R UPA L5 Gd 2-3 ANGELI 5' Gd 3-4 ANGELI 10' Gd 3-4 ANGELI 8'          L sidelying lumbar HVLA  ANGELI ANGELI          Reassessment     ANGELI- jt mobs, ROM, neurodynamics                      Neuro Re-Ed             TA activation  ANGELI 10'           TA SLR  ANGELI 1x10/side ANGELI 2x10 /side          TA PB roll with strap   1x10          Prone Superman   2x8          Slump slider    20x         Post tilt up up down down     10x                     Ther Ex             PPU 10x 2x10; 2x10 2x10          Lateral glide    L to R 3x10; R to L 2x10 L to R 2x10; R to L 2x10        SKTC     20x10\"                                                            Pt edu ANGELI   ANGELI ANGELI        Ther Activity             STS     10x                     Gait Training                                       Modalities                                                "

## 2024-04-10 ENCOUNTER — APPOINTMENT (OUTPATIENT)
Dept: PHYSICAL THERAPY | Facility: CLINIC | Age: 64
End: 2024-04-10
Payer: COMMERCIAL

## 2024-04-17 ENCOUNTER — OFFICE VISIT (OUTPATIENT)
Dept: PHYSICAL THERAPY | Facility: CLINIC | Age: 64
End: 2024-04-17
Payer: COMMERCIAL

## 2024-04-17 DIAGNOSIS — M54.16 LUMBAR RADICULOPATHY: Primary | ICD-10-CM

## 2024-04-17 PROCEDURE — 97110 THERAPEUTIC EXERCISES: CPT | Performed by: PHYSICAL THERAPIST

## 2024-04-17 NOTE — PROGRESS NOTES
PT Discharge     Today's date: 2024  Patient name: Halie Garber  : 1960  MRN: [id number]  Referring provider: Maite Asif*  Dx:   Encounter Diagnosis     ICD-10-CM    1. Lumbar radiculopathy  M54.16                        Assessment  Assessment details: Pt is a 62 y/o female who presents to physical therapy with primary peripheral neuropathic pain associated with lower lumbar radiculopathy in the environment of reduced lower lumbar mobility complicated by BMI and chronicity. Pt reports that she is doing well. Has been able to lift water cases without back pain. She no longer reports numbness in the R calf. She is leaving the area, and therefore today will be the last visit. Pt is (I) with HEP.     Impairments: abnormal gait, abnormal or restricted ROM, activity intolerance, impaired physical strength and pain with function  Understanding of Dx/Px/POC: good  Goals  STG( 4 weeks): - MET  Pt will be independent with basic HEP.  Pt will demonstrate increase in lumbar flexion ROM to WNL without symptom reproduction.      LTG (8 weeks): - MET  FOTO will be expected outcome.   Pt will demonstrate non-painful lumbar R SB.   Pt will report ability to tolerate sitting for 1 hour without increase in symptoms during STS transition.   Pt will report ability to walk dogs without any increase in low back symptoms the next day.       Plan: d/c    Subjective Evaluation    History of Present Illness  Mechanism of injury: Chief Complaint: Pt was seen in this facility in August to October for R sided low back pain and R LE symptoms. She reports that she woke up with it one day insidiously. She states that it began as R calf pain. She had seen PT and the calf pain resolved, but she continued to experience R lateral thigh/hip pain and a return of numbness/tingling in the R lateral calf and foot began. She reports going to chiro without improvement. She also experiences low back pain when lifting too heavy or  "trying to walk big dogs that pull her.     Severity: moderate  Irritability: moderate  Nature: peripheral neuropathic  Stage: chronic  Stability: improving    P1: see body chart    Physical Activity: Continues with old HEP- helping with R lateral thigh pain  Patient Goals  Patient goal: lift without back pain, be able to walk dogs without pain      Objective     Postural Observations    Additional Postural Observation Details  R LE longer than L  Compensation via R SB in standing  Reduced lower lumbar extension    Active Range of Motion     Lumbar AROM WNL and no pain today      Joint Play   No pain with any testing  Mechanical Assessment    Cervical      Thoracic      Lumbar    Lying flexion: repeated movements  Pain intensity: better  Pain level: abolished  L SKTC  Left Sidegliding: repeated movements  Pain location: peripheralized  Pain intensity: worse  Pain level: increased  Right sidegliding: repeated movements  Pain location: peripheralized  Pain intensity:worse  Pain level: increased             Precautions: CKD    POC expires Unit limit Auth Expiration date PT/OT/ST + Visit Limit?   3/28/24 4 N/a 25 combo                           Visit/Unit Tracking  AUTH Status:  Date 2/1               Used 1               Remaining                      Assess neuro screen, neurodynamics           Manuals 2/1 2/9 2/16 2/23 3/29 4/17       R UPA L5 Gd 2-3 ANGELI 5' Gd 3-4 ANGELI 10' Gd 3-4 ANGELI 8'          L sidelying lumbar HVLA  ANGELI ANGELI          Reassessment     ANGELI- jt mobs, ROM, neurodynamics                      Neuro Re-Ed             TA activation  ANGELI 10'           TA SLR  ANGELI 1x10/side ANGELI 2x10 /side          TA PB roll with strap   1x10          Prone Superman   2x8          Slump slider    20x         Post tilt up up down down     10x                     Ther Ex             PPU 10x 2x10; 2x10 2x10          Lateral glide    L to R 3x10; R to L 2x10 L to R 2x10; R to L 2x10        SKTC     20x10\"                                   "                          Pt edu ANGELI   ANGELI ANGELI ANGELI       Ther Activity             STS     10x                     Gait Training                                       Modalities

## 2024-04-18 ENCOUNTER — OFFICE VISIT (OUTPATIENT)
Dept: OBGYN CLINIC | Facility: CLINIC | Age: 64
End: 2024-04-18
Payer: COMMERCIAL

## 2024-04-18 VITALS
DIASTOLIC BLOOD PRESSURE: 78 MMHG | HEIGHT: 64 IN | BODY MASS INDEX: 29.37 KG/M2 | SYSTOLIC BLOOD PRESSURE: 136 MMHG | WEIGHT: 172 LBS

## 2024-04-18 DIAGNOSIS — M54.16 LUMBAR RADICULOPATHY: Primary | ICD-10-CM

## 2024-04-18 PROCEDURE — 99212 OFFICE O/P EST SF 10 MIN: CPT | Performed by: FAMILY MEDICINE
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
## 2025-03-27 ENCOUNTER — TELEPHONE (OUTPATIENT)
Dept: GASTROENTEROLOGY | Facility: CLINIC | Age: 65
End: 2025-03-27

## 2025-03-27 NOTE — TELEPHONE ENCOUNTER
L/M for patient to call back and schedule her recall Colon with Dr Eddy  Her last was in [place] 2020  report in Chart